# Patient Record
Sex: MALE | Race: WHITE | NOT HISPANIC OR LATINO
[De-identification: names, ages, dates, MRNs, and addresses within clinical notes are randomized per-mention and may not be internally consistent; named-entity substitution may affect disease eponyms.]

---

## 2021-05-24 ENCOUNTER — TRANSCRIPTION ENCOUNTER (OUTPATIENT)
Age: 56
End: 2021-05-24

## 2021-05-24 ENCOUNTER — INPATIENT (INPATIENT)
Facility: HOSPITAL | Age: 56
LOS: 4 days | Discharge: HOME | End: 2021-05-29
Attending: INTERNAL MEDICINE | Admitting: INTERNAL MEDICINE
Payer: COMMERCIAL

## 2021-05-24 VITALS
HEIGHT: 72 IN | HEART RATE: 88 BPM | WEIGHT: 187.39 LBS | SYSTOLIC BLOOD PRESSURE: 115 MMHG | OXYGEN SATURATION: 100 % | DIASTOLIC BLOOD PRESSURE: 76 MMHG | TEMPERATURE: 98 F | RESPIRATION RATE: 20 BRPM

## 2021-05-24 LAB
ALBUMIN SERPL ELPH-MCNC: 4.6 G/DL — SIGNIFICANT CHANGE UP (ref 3.5–5.2)
ALP SERPL-CCNC: 77 U/L — SIGNIFICANT CHANGE UP (ref 30–115)
ALT FLD-CCNC: 32 U/L — SIGNIFICANT CHANGE UP (ref 0–41)
ANION GAP SERPL CALC-SCNC: 9 MMOL/L — SIGNIFICANT CHANGE UP (ref 7–14)
APTT BLD: 30.4 SEC — SIGNIFICANT CHANGE UP (ref 27–39.2)
AST SERPL-CCNC: 24 U/L — SIGNIFICANT CHANGE UP (ref 0–41)
BASOPHILS # BLD AUTO: 0.06 K/UL — SIGNIFICANT CHANGE UP (ref 0–0.2)
BASOPHILS NFR BLD AUTO: 0.8 % — SIGNIFICANT CHANGE UP (ref 0–1)
BILIRUB SERPL-MCNC: 0.3 MG/DL — SIGNIFICANT CHANGE UP (ref 0.2–1.2)
BUN SERPL-MCNC: 11 MG/DL — SIGNIFICANT CHANGE UP (ref 10–20)
CALCIUM SERPL-MCNC: 9.4 MG/DL — SIGNIFICANT CHANGE UP (ref 8.5–10.1)
CHLORIDE SERPL-SCNC: 104 MMOL/L — SIGNIFICANT CHANGE UP (ref 98–110)
CO2 SERPL-SCNC: 25 MMOL/L — SIGNIFICANT CHANGE UP (ref 17–32)
CREAT SERPL-MCNC: 0.9 MG/DL — SIGNIFICANT CHANGE UP (ref 0.7–1.5)
EOSINOPHIL # BLD AUTO: 0.17 K/UL — SIGNIFICANT CHANGE UP (ref 0–0.7)
EOSINOPHIL NFR BLD AUTO: 2.2 % — SIGNIFICANT CHANGE UP (ref 0–8)
GLUCOSE SERPL-MCNC: 96 MG/DL — SIGNIFICANT CHANGE UP (ref 70–99)
HCT VFR BLD CALC: 42.8 % — SIGNIFICANT CHANGE UP (ref 42–52)
HGB BLD-MCNC: 14.2 G/DL — SIGNIFICANT CHANGE UP (ref 14–18)
IMM GRANULOCYTES NFR BLD AUTO: 0.1 % — SIGNIFICANT CHANGE UP (ref 0.1–0.3)
INR BLD: 1.1 RATIO — SIGNIFICANT CHANGE UP (ref 0.65–1.3)
LYMPHOCYTES # BLD AUTO: 2.35 K/UL — SIGNIFICANT CHANGE UP (ref 1.2–3.4)
LYMPHOCYTES # BLD AUTO: 31.1 % — SIGNIFICANT CHANGE UP (ref 20.5–51.1)
MCHC RBC-ENTMCNC: 27.7 PG — SIGNIFICANT CHANGE UP (ref 27–31)
MCHC RBC-ENTMCNC: 33.2 G/DL — SIGNIFICANT CHANGE UP (ref 32–37)
MCV RBC AUTO: 83.4 FL — SIGNIFICANT CHANGE UP (ref 80–94)
MONOCYTES # BLD AUTO: 0.58 K/UL — SIGNIFICANT CHANGE UP (ref 0.1–0.6)
MONOCYTES NFR BLD AUTO: 7.7 % — SIGNIFICANT CHANGE UP (ref 1.7–9.3)
NEUTROPHILS # BLD AUTO: 4.39 K/UL — SIGNIFICANT CHANGE UP (ref 1.4–6.5)
NEUTROPHILS NFR BLD AUTO: 58.1 % — SIGNIFICANT CHANGE UP (ref 42.2–75.2)
NRBC # BLD: 0 /100 WBCS — SIGNIFICANT CHANGE UP (ref 0–0)
PLATELET # BLD AUTO: 289 K/UL — SIGNIFICANT CHANGE UP (ref 130–400)
POTASSIUM SERPL-MCNC: 4.2 MMOL/L — SIGNIFICANT CHANGE UP (ref 3.5–5)
POTASSIUM SERPL-SCNC: 4.2 MMOL/L — SIGNIFICANT CHANGE UP (ref 3.5–5)
PROT SERPL-MCNC: 6.8 G/DL — SIGNIFICANT CHANGE UP (ref 6–8)
PROTHROM AB SERPL-ACNC: 12.7 SEC — SIGNIFICANT CHANGE UP (ref 9.95–12.87)
RBC # BLD: 5.13 M/UL — SIGNIFICANT CHANGE UP (ref 4.7–6.1)
RBC # FLD: 13 % — SIGNIFICANT CHANGE UP (ref 11.5–14.5)
SODIUM SERPL-SCNC: 138 MMOL/L — SIGNIFICANT CHANGE UP (ref 135–146)
TROPONIN T SERPL-MCNC: <0.01 NG/ML — SIGNIFICANT CHANGE UP
WBC # BLD: 7.56 K/UL — SIGNIFICANT CHANGE UP (ref 4.8–10.8)
WBC # FLD AUTO: 7.56 K/UL — SIGNIFICANT CHANGE UP (ref 4.8–10.8)

## 2021-05-24 PROCEDURE — 70498 CT ANGIOGRAPHY NECK: CPT | Mod: 26,MA

## 2021-05-24 PROCEDURE — 70450 CT HEAD/BRAIN W/O DYE: CPT | Mod: 26,59,MA

## 2021-05-24 PROCEDURE — 99223 1ST HOSP IP/OBS HIGH 75: CPT

## 2021-05-24 PROCEDURE — 99285 EMERGENCY DEPT VISIT HI MDM: CPT

## 2021-05-24 PROCEDURE — 93010 ELECTROCARDIOGRAM REPORT: CPT | Mod: 76

## 2021-05-24 PROCEDURE — 71045 X-RAY EXAM CHEST 1 VIEW: CPT | Mod: 26

## 2021-05-24 PROCEDURE — 70496 CT ANGIOGRAPHY HEAD: CPT | Mod: 26,MA

## 2021-05-24 RX ORDER — ASPIRIN/CALCIUM CARB/MAGNESIUM 324 MG
325 TABLET ORAL ONCE
Refills: 0 | Status: COMPLETED | OUTPATIENT
Start: 2021-05-24 | End: 2021-05-24

## 2021-05-24 RX ADMIN — Medication 325 MILLIGRAM(S): at 20:15

## 2021-05-24 NOTE — STROKE CODE NOTE - NSSTROKETEAMACTIVATEDDT_GEN_A_CORE
Concentration Of Solution Injected (Mg/Ml): 10.0
X Size Of Lesion In Cm (Optional): 0
Kenalog Preparation: Kenalog
Medical Necessity Clause: This procedure was medically necessary because the lesions that were treated were:
Detail Level: Detailed
Include Z78.9 (Other Specified Conditions Influencing Health Status) As An Associated Diagnosis?: No
Total Volume Injected (Ccs- Only Use Numbers And Decimals): 1
24-May-2021 19:49

## 2021-05-24 NOTE — ED PROVIDER NOTE - ATTENDING CONTRIBUTION TO CARE
57 yo M no pmh presents with numbness to the left arm. patient got a covid vaccine 4 days ago. This morning when he woke up at 6am noted numbness and tingling to the entire left arm. Last known well was yesterday at 11pm. no other numbness, tingling or weakness. Walking normally. no chest pain, no shortness of breath, no palpitations.     CONSTITUTIONAL: Well-developed; well-nourished; in no acute distress.   SKIN: warm, dry  HEAD: Normocephalic; atraumatic.  EYES: PERRL, EOMI, no conjunctival erythema  ENT: No nasal discharge; airway clear.  NECK: Supple; non tender.  CARD: S1, S2 normal;  Regular rate and rhythm.   RESP: No wheezes, rales or rhonchi.  ABD: soft non tender, non distended, no rebound or guarding.  EXT: Normal ROM.  5/5 strength in all 4 extremities   LYMPH: No acute cervical adenopathy.  NEURO: NIH of 1, decreased sensation to the left upper extremity.   PSYCH: Cooperative, appropriate.

## 2021-05-24 NOTE — ED PROVIDER NOTE - PROGRESS NOTE DETAILS
JESSI. [place] [facility] [place] at bedside upon arrival to ED. D.C. EKG noted, no previous in system. pt has not seen cardiologist, has had normal previous lab work. At this time no CP, SOB, palpitations. Cardiology consulted for evaluation. D.C. EKG noted, no previous in system. pt has not seen cardiologist, has had normal previous lab work annually. Last w/u normal by PCP. At this time no CP, SOB, palpitations. Cardiology consulted for evaluation. D.C: Repeat EKG normal and not indicative of ischemic changes.

## 2021-05-24 NOTE — ED ADULT NURSE NOTE - CHIEF COMPLAINT QUOTE
[place] [facility] [place] of sensation to left arm"; pt also presents with general malaise, lightheadedness, low grade fevers x3days. LUE symptoms started 6am this morning.  COVID vaccine received 5/20/21

## 2021-05-24 NOTE — ED PROVIDER NOTE - PHYSICAL EXAMINATION
Physical Exam    Vital Signs: I have reviewed the initial vital signs.  Constitutional: well-nourished, appears stated age, no acute distress  Eyes: Conjunctiva pink, Sclera clear, PERRLA, EOMI, no ptosis, no entrapment, no racoon eyes.  Cardiovascular: S1 and S2, regular rate, regular rhythm, well-perfused extremities, radial pulses equal and 2+, calves nonttp, equal in size  Respiratory: unlabored respiratory effort, speaking in full sentences, handling oral secretions, % on RA , on nasal cannula, clear to auscultation bilaterally no wheezing, rales and rhonchi  Gastrointestinal: soft, non-tender abdomen, no pulsatile mass, normal bowl sounds  Musculoskeletal: supple neck, no lower extremity edema, no midline tenderness, paraspinal tenderness, clavicular creptius, painful rom, moving all extreities appropriately, no gross bony deformities or swelling.  Integumentary: warm, dry, no rashes, lacerations,  Neurologic: awake, alert, cranial nerves II-XII grossly intact, extremities’ motor functions grossly intact, no nystagmus, tremors, fasciculations, facial droop, no ataxia, no dysmetria. Slight decreased to sensation to sharp stimulus along deltoid (LUE)

## 2021-05-24 NOTE — ED PROVIDER NOTE - CLINICAL SUMMARY MEDICAL DECISION MAKING FREE TEXT BOX
Patient presents with left arm numbness. stroke code called on arrival. labs, ekg, xray, ct, cta done. Found to have Abrupt occlusion of the right proximal M2 portion of the MCA with reconstitution distally. Not a TPA candidate at this time. Approved to stroke unit. Patient and family ware of findings. Patient's EKG shows ischemic changes, no chest pain or SOB. Repeat EKG normal. troponin negative. Patient admitted for further management.

## 2021-05-24 NOTE — ED PROVIDER NOTE - OBJECTIVE STATEMENT
56 y.o. male no pmh, s/p covid 19 vaccination on 5/20 with LUE sensation change since last night. Pt states started in upper arm and now in palm. Ex: unable to feel phone in pocket. No facial droop, slurred speech, difficuulty ambulating. + malaise, muscle weakness and subjective fevers. 56 y.o. male no pmh, s/p covid 19 vaccination on 5/20 with LUE sensation change since 6 am this morning. Last known well 11 pm last night. Pt states started in upper arm and now in palm. Ex: unable to feel phone in pocket.No facial droop, slurred speech, difficulty ambulating. + malaise, muscle weakness, + lightheadedness and subjective fever since yesterday..

## 2021-05-24 NOTE — CONSULT NOTE ADULT - SUBJECTIVE AND OBJECTIVE BOX
Vascular Neurology Consult Note:    KACI LYLES    1. Chief Complaint: Numbness    HPI: This is a 57 yo M who presented to the ED from urgent care complaining of left arm numbness. Symptoms started this morning at 0600, with a last known well 11 pm. He denies any focal weakness, aphasia, dysarthria, visual deficit, or other focal deficit.     2. Relevant PMH: Denied  Prior ischemic stroke/TIA[ ], Afib [ ], CAD [ ], HTN [ ], DLD [ ], DM [ ], PVD [ ], Obesity [ ],   Sedentary lifestyle [ ], CHF [ ], ORLANDO [ ], Cancer Hx [ ].    3. Social History: Smoking [ ], Drug Use [ ], Alcohol Use [ ], Other [ ]    4. Possible Location of Stroke: Right parietal lobe    5. Relevant Brain Tissue Imaging:  < from: CT Brain Stroke Protocol (05.24.21 @ 20:04) >  IMPRESSION:      1.  Subacute appearing infarct in the right parietotemporal region. No acute territorial infarct, intracranial hemorrhage, mass effect or midline shift. Focal thrombosis of the right MCA. Please correlate with concurrent CT  2.  Asymmetry of the right frontal horn, which may be normal variant.    < end of copied text >    6. Relevant Cerebrovascular Imaging:   CT Angio Head w/ IV Cont:   EXAM:  CT ANGIO NECK (W)AW IC        EXAM:  CT ANGIO BRAIN (W)AW IC            IMPRESSION:  Focal nonocclusive thrombus of the right inferior division M2 segment of the MCA with reconstitution distally. Distal MCA cortical branch vessels are well opacified.    Intracranial vessels are otherwise unremarkable without evidence of vessel malformation or aneurysm.    No carotid or vertebral artery stenosis.    7. Relevant blood tests:      8. Relevant cardiac rhythm monitoring: NSR, rate 65, normal QRS axis, ST depression lead III    9. Relevant Cardiac Structure: (TTE/ISRAEL +/-):[ ]No intracardiac thrombus/[ ] no vegetation/[ ]no akynesia/EF:    Home Medications:      MEDICATIONS  (STANDING):      10. PT/OT/Speech/Rehab/S&Sw/ Cognitive eval results and recommendations:    11. Neurologic Examination:  Mentation: Awake, alert, oriented to person, place, and time. Follows complex, multiple part commands. No neglect.  Language: Speech is clear and fluent.  Cranial Nerves:  	II – Visual fields full.  	III/IV/VI – Pupils 3 mm. PERRL. EOMI.  	V – Grossly intact sensation.  	VII – No facial palsy.  	VIII – No nystagmus.  	IX/X – Symmetric palate rise. Uvula midline.  	XI – SCM strong b/l.  	XII – Tongue protrusion midline.  Motor: 5/5 strength b/l. Normal bulk and tone.  Sensory: Decreased sensation to LT in LUE.  Reflexes: 2+ generally.  Cerebellum: No dysmetria. Gait deferred.    Vital Signs Last 24 Hrs  T(C): 36.4 (24 May 2021 19:33), Max: 36.4 (24 May 2021 19:33)  T(F): 97.5 (24 May 2021 19:33), Max: 97.5 (24 May 2021 19:33)  HR: 72 (24 May 2021 20:00) (72 - 88)  BP: 119/74 (24 May 2021 20:00) (115/76 - 119/74)  BP(mean): --  RR: 17 (24 May 2021 20:00) (17 - 20)  SpO2: 100% (24 May 2021 20:00) (100% - 100%)    12. NIH STROKE SCALE  Item	                                                        Score  1 a.	Level of Consciousness	               	  1 b. LOC Questions	                                  1 c.	LOC Commands	                               	  2.	Best Gaze	                                          3.	Visual	                                                  4.	Facial Palsy	                                          5 a.	Motor Arm - Left	                                  5 b.	Motor Arm - Right	                          6 a.	Motor Leg - Left	                                  6 b.	Motor Leg - Right	                                  7.	Limb Ataxia	                                          8.	Sensory	                                                1  9.	Language	                                          10.	Dysarthria	                                          11.	Extinction and Inattention  	          ______________________________________  TOTAL	                                                        1    Total NIHSS on admission:   1   NIHSS yesterday:      NIHSS today:     Baseline mRS:  0 No symptoms at all  1 No significant disability despite symptoms; able to carry out all usual duties and activities without assistance  2 Slight disability; unable to carry out all previous activities, but able to look after own affairs  3 Moderate disability; requiring some help, but able to walk without assistance  4 Moderately severe disability; unable to walk without assistance and unable to attend to own bodily needs without assistance  5 Severe disability; bedridden, incontinent and requiring constant nursing care and attention  6 Dead      13. Impression:    - Subacute right parietotemporal infarct  - Acute right M2 occlusion     14. Probable cause/s of Stroke: Unclear, cannot exclude cardioembolism       15. Suggestions:   Routine stroke workup including:    - Neuro check q4  -  mg now then 81 mg daily  - Lipitor 80 mg HS  - Brain MRI if no contraindication  - Check 2D echocardiogram w/bubble  - Check lipid panel/A1c  - Check hypercoagulable panel  - May require ISRAEL/ILR this admission  - Telemetry  - Nursing swallow screen  - PT/OT eval and tx    This was not a candidate for emergent neuroendovascular intervention given low NIHSS score consistent with non-disabling deficits. He was out of the tPA window.    16. Disposition: Stroke Unit   Vascular Neurology Consult Note:    KACI LYLES    1. Chief Complaint: Numbness    HPI: This is a 57 yo M who presented to the ED from urgent care complaining of left arm numbness. Symptoms started this morning at 0600, with a last known well 11 pm. He denies any focal weakness, aphasia, dysarthria, visual deficit, or other focal deficit.     2. Relevant PMH: Denied  Prior ischemic stroke/TIA[ ], Afib [ ], CAD [ ], HTN [ ], DLD [ ], DM [ ], PVD [ ], Obesity [ ],   Sedentary lifestyle [ ], CHF [ ], [place]O [ ], Cancer Hx [ ].    3. Social History: Smoking [ ], Drug Use [ ], Alcohol Use [ ], Other [ ]    4. Possible Location of Stroke: Right parietal lobe    5. Relevant Brain Tissue Imaging:  < from: CT Brain Stroke Protocol (05.24.21 @ 20:04) >  IMPRESSION:      1.  Subacute appearing infarct in the right parietotemporal region. No acute territorial infarct, intracranial hemorrhage, mass effect or midline shift. Focal thrombosis of the right MCA. Please correlate with concurrent CT  2.  Asymmetry of the right frontal horn, which may be normal variant.    < end of copied text >    6. Relevant Cerebrovascular Imaging:   CT Angio Head w/ IV Cont:   EXAM:  CT ANGIO NECK (W)AW IC        EXAM:  CT ANGIO BRAIN (W)AW IC            IMPRESSION:  Focal nonocclusive thrombus of the right inferior division M2 segment of the MCA with reconstitution distally. Distal MCA cortical branch vessels are well opacified.    Intracranial vessels are otherwise unremarkable without evidence of vessel malformation or aneurysm.    No carotid or vertebral artery stenosis.    7. Relevant blood tests:      8. Relevant cardiac rhythm monitoring: NSR, rate 65, normal QRS axis, ST depression lead III    9. Relevant Cardiac Structure: (TTE/ISRAEL +/-):[ ]No intracardiac thrombus/[ ] no vegetation/[ ]no akynesia/EF:    Home Medications:      MEDICATIONS  (STANDING):      10. PT/OT/Speech/Rehab/S&Sw/ Cognitive eval results and recommendations:    11. Neurologic Examination:  Mentation: Awake, alert, oriented to person, place, and time. Follows complex, multiple part commands. No neglect.  Language: Speech is clear and fluent.  Cranial Nerves:  	II – Visual fields full.  	III/IV/VI – Pupils 3 mm. PERRL. EOMI.  	V – Grossly intact sensation.  	VII – No facial palsy.  	VIII – No nystagmus.  	IX/X – Symmetric palate rise. Uvula midline.  	XI – SCM strong b/l.  	XII – Tongue protrusion midline.  Motor: 5/5 strength b/l. Normal bulk and tone.  Sensory: Decreased sensation to LT in LUE.  Reflexes: 2+ generally.  Cerebellum: No dysmetria. Gait deferred.    Vital Signs Last 24 Hrs  T(C): 36.4 (24 May 2021 19:33), Max: 36.4 (24 May 2021 19:33)  T(F): 97.5 (24 May 2021 19:33), Max: 97.5 (24 May 2021 19:33)  HR: 72 (24 May 2021 20:00) (72 - 88)  BP: 119/74 (24 May 2021 20:00) (115/76 - 119/74)  BP(mean): --  RR: 17 (24 May 2021 20:00) (17 - 20)  SpO2: 100% (24 May 2021 20:00) (100% - 100%)    12. NIH STROKE SCALE  Item	                                                        Score  1 a.	Level of Consciousness	               	  1 b. LOC Questions	                                  1 c.	LOC Commands	                               	  2.	Best Gaze	                                          3.	Visual	                                                  4.	Facial Palsy	                                          5 a.	Motor Arm - Left	                                  5 b.	Motor Arm - Right	                          6 a.	Motor Leg - Left	                                  6 b.	Motor Leg - Right	                                  7.	Limb Ataxia	                                          8.	Sensory	                                                1  9.	Language	                                          10.	Dysarthria	                                          11.	Extinction and Inattention  	          ______________________________________  TOTAL	                                                        1    Total NIHSS on admission:   1   NIHSS yesterday:      NIHSS today: 1    Baseline mRS:  0 No symptoms at all  1 No significant disability despite symptoms; able to carry out all usual duties and activities without assistance  2 Slight disability; unable to carry out all previous activities, but able to look after own affairs  3 Moderate disability; requiring some help, but able to walk without assistance  4 Moderately severe disability; unable to walk without assistance and unable to attend to own bodily needs without assistance  5 Severe disability; bedridden, incontinent and requiring constant nursing care and attention  6 Dead      13. Impression:    - Subacute right parietotemporal infarct  - Acute right M2 occlusion     14. Probable cause/s of Stroke: Unclear, cannot exclude cardioembolism       15. Suggestions:   Routine stroke workup including:    - Neuro check q4  -  mg now then 81 mg daily  -Plavix 300 load and continue 75 daily  - Lipitor 80 mg HS  - Brain MRI No ANA  - Check 2D echocardiogram w/bubble  - Check lipid panel/A1c  - Check hypercoagulable panel  -  ISRAEL/ILR   - PT/OT eval and tx    This was not a candidate for emergent neuroendovascular intervention given low NIHSS score consistent with non-disabling deficits. He was out of the tPA window.    16. Disposition: Stroke Unit

## 2021-05-24 NOTE — ED ADULT TRIAGE NOTE - CHIEF COMPLAINT QUOTE
[place] [facility] [place]"; pt also presents with general malaise, lightheadedness, low grade fevers x3days. LUE symptoms started 6am this morning.  COVID vaccine received 5/20/21

## 2021-05-24 NOTE — ED ADULT NURSE NOTE - OBJECTIVE STATEMENT
[facility] for "loss of sensation to left arm"; pt also presents with general malaise, lightheadedness, low grade fevers x3days. LUE symptoms started 6am this morning.

## 2021-05-24 NOTE — ED PROVIDER NOTE - NS ED ROS FT
Constitutional: (+) fever (-) chills (+) lightheadedness   Eyes/ENT: (-) blurry vision, (-) epistaxis (-) rhinorrhea (-) nasal congestion  Cardiovascular: (-) chest pain, (-) syncope (-) palpitations   Respiratory: (-) cough, (-) shortness of breath (-) pleurisy   Gastrointestinal: (-) vomiting, (-) diarrhea (-) abdominal pain (-) nausea (-) anorexia  Musculoskeletal: (-) neck pain, (-) back pain, (-) joint pain (-) joint swelling (-) painful ROM  Integumentary: (-) rash, (-) edema (-) lacerations (-) pruritis   Neurological: (+) headache, (-) altered mental status (-) LOC (-) dizziness (+) paresthesias (-) gait abnormalities

## 2021-05-24 NOTE — ED ADULT NURSE NOTE - PMH
Keep splints on both legs  Continue present medications  For severe pain take either tramadol or Percocet but do not take both the same day  Contact Dr Dae Mejia office  Ask them how he wants you to care for the wound on your foot, since the splint is over it  Ask if you need home health nursing for that  Obtain wheelchair, commode and sliding board from [facility]    Follow the directions given to you by our care management team  No pertinent past medical history     <<----- Click to add NO pertinent Past Medical History

## 2021-05-25 LAB
A1C WITH ESTIMATED AVERAGE GLUCOSE RESULT: 5.4 % — SIGNIFICANT CHANGE UP (ref 4–5.6)
ALBUMIN SERPL ELPH-MCNC: 4.2 G/DL — SIGNIFICANT CHANGE UP (ref 3.5–5.2)
ALP SERPL-CCNC: 76 U/L — SIGNIFICANT CHANGE UP (ref 30–115)
ALT FLD-CCNC: 28 U/L — SIGNIFICANT CHANGE UP (ref 0–41)
ANION GAP SERPL CALC-SCNC: 8 MMOL/L — SIGNIFICANT CHANGE UP (ref 7–14)
APTT BLD: 29.8 SEC — SIGNIFICANT CHANGE UP (ref 27–39.2)
AST SERPL-CCNC: 22 U/L — SIGNIFICANT CHANGE UP (ref 0–41)
BASOPHILS # BLD AUTO: 0.08 K/UL — SIGNIFICANT CHANGE UP (ref 0–0.2)
BASOPHILS NFR BLD AUTO: 1.4 % — HIGH (ref 0–1)
BILIRUB SERPL-MCNC: 0.4 MG/DL — SIGNIFICANT CHANGE UP (ref 0.2–1.2)
BUN SERPL-MCNC: 9 MG/DL — LOW (ref 10–20)
CALCIUM SERPL-MCNC: 9.2 MG/DL — SIGNIFICANT CHANGE UP (ref 8.5–10.1)
CHLORIDE SERPL-SCNC: 105 MMOL/L — SIGNIFICANT CHANGE UP (ref 98–110)
CHOLEST SERPL-MCNC: 190 MG/DL — SIGNIFICANT CHANGE UP
CO2 SERPL-SCNC: 26 MMOL/L — SIGNIFICANT CHANGE UP (ref 17–32)
COVID-19 SPIKE DOMAIN AB INTERP: POSITIVE
COVID-19 SPIKE DOMAIN ANTIBODY RESULT: >250 U/ML — HIGH
CREAT SERPL-MCNC: 0.8 MG/DL — SIGNIFICANT CHANGE UP (ref 0.7–1.5)
EOSINOPHIL # BLD AUTO: 0.23 K/UL — SIGNIFICANT CHANGE UP (ref 0–0.7)
EOSINOPHIL NFR BLD AUTO: 4 % — SIGNIFICANT CHANGE UP (ref 0–8)
ESTIMATED AVERAGE GLUCOSE: 108 MG/DL — SIGNIFICANT CHANGE UP (ref 68–114)
GLUCOSE SERPL-MCNC: 97 MG/DL — SIGNIFICANT CHANGE UP (ref 70–99)
HCT VFR BLD CALC: 41.3 % — LOW (ref 42–52)
HCV AB S/CO SERPL IA: 0.03 COI — SIGNIFICANT CHANGE UP
HCV AB SERPL-IMP: SIGNIFICANT CHANGE UP
HDLC SERPL-MCNC: 38 MG/DL — LOW
HGB BLD-MCNC: 14 G/DL — SIGNIFICANT CHANGE UP (ref 14–18)
IMM GRANULOCYTES NFR BLD AUTO: 0.2 % — SIGNIFICANT CHANGE UP (ref 0.1–0.3)
INR BLD: 1.12 RATIO — SIGNIFICANT CHANGE UP (ref 0.65–1.3)
LIPID PNL WITH DIRECT LDL SERPL: 125 MG/DL — HIGH
LYMPHOCYTES # BLD AUTO: 2.14 K/UL — SIGNIFICANT CHANGE UP (ref 1.2–3.4)
LYMPHOCYTES # BLD AUTO: 36.8 % — SIGNIFICANT CHANGE UP (ref 20.5–51.1)
MAGNESIUM SERPL-MCNC: 2.2 MG/DL — SIGNIFICANT CHANGE UP (ref 1.8–2.4)
MCHC RBC-ENTMCNC: 28.2 PG — SIGNIFICANT CHANGE UP (ref 27–31)
MCHC RBC-ENTMCNC: 33.9 G/DL — SIGNIFICANT CHANGE UP (ref 32–37)
MCV RBC AUTO: 83.1 FL — SIGNIFICANT CHANGE UP (ref 80–94)
MONOCYTES # BLD AUTO: 0.47 K/UL — SIGNIFICANT CHANGE UP (ref 0.1–0.6)
MONOCYTES NFR BLD AUTO: 8.1 % — SIGNIFICANT CHANGE UP (ref 1.7–9.3)
NEUTROPHILS # BLD AUTO: 2.88 K/UL — SIGNIFICANT CHANGE UP (ref 1.4–6.5)
NEUTROPHILS NFR BLD AUTO: 49.5 % — SIGNIFICANT CHANGE UP (ref 42.2–75.2)
NON HDL CHOLESTEROL: 152 MG/DL — HIGH
NRBC # BLD: 0 /100 WBCS — SIGNIFICANT CHANGE UP (ref 0–0)
PLATELET # BLD AUTO: 256 K/UL — SIGNIFICANT CHANGE UP (ref 130–400)
POTASSIUM SERPL-MCNC: 4.3 MMOL/L — SIGNIFICANT CHANGE UP (ref 3.5–5)
POTASSIUM SERPL-SCNC: 4.3 MMOL/L — SIGNIFICANT CHANGE UP (ref 3.5–5)
PROT SERPL-MCNC: 6.2 G/DL — SIGNIFICANT CHANGE UP (ref 6–8)
PROTHROM AB SERPL-ACNC: 12.9 SEC — HIGH (ref 9.95–12.87)
RBC # BLD: 4.97 M/UL — SIGNIFICANT CHANGE UP (ref 4.7–6.1)
RBC # FLD: 13.2 % — SIGNIFICANT CHANGE UP (ref 11.5–14.5)
SARS-COV-2 IGG+IGM SERPL QL IA: >250 U/ML — HIGH
SARS-COV-2 IGG+IGM SERPL QL IA: POSITIVE
SARS-COV-2 RNA SPEC QL NAA+PROBE: SIGNIFICANT CHANGE UP
SODIUM SERPL-SCNC: 139 MMOL/L — SIGNIFICANT CHANGE UP (ref 135–146)
TRIGL SERPL-MCNC: 156 MG/DL — HIGH
TSH SERPL-MCNC: 0.65 UIU/ML — SIGNIFICANT CHANGE UP (ref 0.27–4.2)
WBC # BLD: 5.81 K/UL — SIGNIFICANT CHANGE UP (ref 4.8–10.8)
WBC # FLD AUTO: 5.81 K/UL — SIGNIFICANT CHANGE UP (ref 4.8–10.8)

## 2021-05-25 PROCEDURE — 99223 1ST HOSP IP/OBS HIGH 75: CPT

## 2021-05-25 PROCEDURE — 70553 MRI BRAIN STEM W/O & W/DYE: CPT | Mod: 26

## 2021-05-25 RX ORDER — ASPIRIN/CALCIUM CARB/MAGNESIUM 324 MG
81 TABLET ORAL DAILY
Refills: 0 | Status: DISCONTINUED | OUTPATIENT
Start: 2021-05-25 | End: 2021-05-29

## 2021-05-25 RX ORDER — CLOPIDOGREL BISULFATE 75 MG/1
300 TABLET, FILM COATED ORAL ONCE
Refills: 0 | Status: COMPLETED | OUTPATIENT
Start: 2021-05-25 | End: 2021-05-25

## 2021-05-25 RX ORDER — CLOPIDOGREL BISULFATE 75 MG/1
75 TABLET, FILM COATED ORAL DAILY
Refills: 0 | Status: DISCONTINUED | OUTPATIENT
Start: 2021-05-25 | End: 2021-05-29

## 2021-05-25 RX ORDER — ATORVASTATIN CALCIUM 80 MG/1
80 TABLET, FILM COATED ORAL AT BEDTIME
Refills: 0 | Status: DISCONTINUED | OUTPATIENT
Start: 2021-05-25 | End: 2021-05-29

## 2021-05-25 RX ORDER — CHLORHEXIDINE GLUCONATE 213 G/1000ML
1 SOLUTION TOPICAL ONCE
Refills: 0 | Status: DISCONTINUED | OUTPATIENT
Start: 2021-05-25 | End: 2021-05-29

## 2021-05-25 RX ORDER — ENOXAPARIN SODIUM 100 MG/ML
40 INJECTION SUBCUTANEOUS DAILY
Refills: 0 | Status: DISCONTINUED | OUTPATIENT
Start: 2021-05-25 | End: 2021-05-29

## 2021-05-25 RX ORDER — SODIUM CHLORIDE 9 MG/ML
1000 INJECTION INTRAMUSCULAR; INTRAVENOUS; SUBCUTANEOUS
Refills: 0 | Status: DISCONTINUED | OUTPATIENT
Start: 2021-05-25 | End: 2021-05-28

## 2021-05-25 RX ADMIN — Medication 81 MILLIGRAM(S): at 11:25

## 2021-05-25 RX ADMIN — ENOXAPARIN SODIUM 40 MILLIGRAM(S): 100 INJECTION SUBCUTANEOUS at 11:25

## 2021-05-25 RX ADMIN — SODIUM CHLORIDE 125 MILLILITER(S): 9 INJECTION INTRAMUSCULAR; INTRAVENOUS; SUBCUTANEOUS at 12:22

## 2021-05-25 RX ADMIN — CLOPIDOGREL BISULFATE 75 MILLIGRAM(S): 75 TABLET, FILM COATED ORAL at 12:16

## 2021-05-25 RX ADMIN — ATORVASTATIN CALCIUM 80 MILLIGRAM(S): 80 TABLET, FILM COATED ORAL at 21:57

## 2021-05-25 RX ADMIN — CLOPIDOGREL BISULFATE 300 MILLIGRAM(S): 75 TABLET, FILM COATED ORAL at 12:16

## 2021-05-25 RX ADMIN — SODIUM CHLORIDE 125 MILLILITER(S): 9 INJECTION INTRAMUSCULAR; INTRAVENOUS; SUBCUTANEOUS at 21:58

## 2021-05-25 NOTE — H&P ADULT - HISTORY OF PRESENT ILLNESS
54 yo M w/ no pmhx presents from Urgent Care w/ LUE numbness that started this AM ~6AM.     Denies dysarthria, aphasia, visual or other sensory defects.    ED course: *** Code stroke called ***   - VS wnl  - CXR not impressive  - MR Brain: Subacute appearing infarct in the right parietotemporal region.    - CTA H&N: Focal nonocclusive thrombus of the right inferior division M2 segment of the MCA with reconstitution distally.  - Seen by Neurology>> Out of window for tPA & not a candidate for emergent neuroendovascular intervention given low NIHSS score consistent with non-disabling deficits. He was out of the tPA window.  - Received 325mg Aspirin x1    Admit to Stroke Unit.    54 yo M w/ no pmhx presents from Urgent Care w/ LUE numbness that started this AM ~6AM.     Denies dysarthria, aphasia, visual or other sensory defects.    ED course: *** Code stroke called ***   - VS wnl  - CXR not impressive  - MR Brain: Subacute appearing infarct in the right parietotemporal region.    - CTA H&N: Focal nonocclusive thrombus of the right inferior division M2 segment of the MCA with reconstitution distally.  - Seen by Neurology>> Out of window for tPA & not a candidate for emergent neuroendovascular intervention given low NIHSS score consistent with non-disabling deficits.   - Received 325mg Aspirin x1    Admit to Stroke Unit.    54 yo M w/ no pmhx presents from Urgent Care w/ LUE numbness that started this AM ~6AM.   Patient received 2nd dose of Moderna COVID Vaccine ~10days ago.     Denies dysarthria, aphasia, visual or other sensory defects.    ED course: *** Code stroke called ***   - VS wnl  - CXR not impressive  - MR Brain: Subacute appearing infarct in the right parietotemporal region.    - CTA H&N: Focal nonocclusive thrombus of the right inferior division M2 segment of the MCA with reconstitution distally.  - Seen by Neurology>> Out of window for tPA & not a candidate for emergent neuroendovascular intervention given low NIHSS score consistent with non-disabling deficits.   - Received 325mg Aspirin x1    Admit to Stroke Unit.

## 2021-05-25 NOTE — H&P ADULT - ATTENDING COMMENTS
pt seen and examined.   still has difficulty with fine motor function on the left arm   T(F): 97.8 (05-25-21 @ 08:05), Max: 98.4 (05-25-21 @ 05:00)  HR: 77 (05-25-21 @ 08:05) (63 - 88)  BP: 104/73 (05-25-21 @ 08:05) (104/73 - 120/66)  RR: 17 (05-25-21 @ 08:05) (17 - 20)  SpO2: 97% (05-25-21 @ 08:05) (97% - 100%)    physical exam Physical exam:   constitutional NAD, AAOX3, Respiratory lungs CTA, CVS heart RRR, GI: abdomen Soft NT, ND, BS+, skin: intact  neuro exam left upper ext numbness and slight weakness.     < from: CT Angio Neck w/ IV Cont (05.24.21 @ 20:23) >  Focal nonocclusive thrombus of the right inferior division M2 segment of the MCA with reconstitution distally. Distal MCA cortical branch vessels are well opacified.  Intracranial vessels are otherwise unremarkable without evidence of vessel malformation or aneurysm.  No carotid or vertebral artery stenosis.  < end of copied text >    Lipid Profile (05.25.21 @ 05:20)    Cholesterol, Serum: 190 mg/dL    Triglycerides, Serum: 156 mg/dL    HDL Cholesterol, Serum: 38 mg/dL    Non HDL Cholesterol: 152    a/p  # Acute cva, MCA thrombosis , MRI brain, consider ac, will need workup for hypercoagulable state, as well as echo / may need ISRAEL, as per neurology    #Progress Note Handoff  Pending (specify):  MRI brain , echo   Family discussion: dw pt, fully aaox3,   Disposition: Home

## 2021-05-25 NOTE — OCCUPATIONAL THERAPY INITIAL EVALUATION ADULT - PLANNED THERAPY INTERVENTIONS, OT EVAL
ADL retraining/cognitive, visual perceptual/fine motor coordination training/motor coordination training/neuromuscular re-education/ROM

## 2021-05-25 NOTE — OCCUPATIONAL THERAPY INITIAL EVALUATION ADULT - GENERAL OBSERVATIONS, REHAB EVAL
Pt received semi [place] on stretcher, spouse and sister present, +tele, +pulse oxi, agreeable to OT evaluation

## 2021-05-25 NOTE — H&P ADULT - ASSESSMENT
56 yo M w/ no pmhx presents from Urgent Care w/ LUE numbness that started this AM ~6AM.   Denies focal weakness, dysarthria, aphasia, visual or other sensory defects    ED: Code stroke called>> MR Brain (+) for Rt. Parietotemporal Subacute infarct>> Out of tPA window & not an emergent neuroendovascular intervention candidate.    Admit to Stroke Unit;      # Subacute Infarct in the Rt. Parietotemporal Region - p/w LUE Numbness; NIHSS=1   No risk factors for stroke noted except for a Vaccine shot ~10days ago  - Seen by Neurology>> Out of tPA window & not an emergent neuroendovascular intervention candidate given low NIHSS & nondisabling deficit; May need ISRAEL if TTE is negative;  - Loaded w/ 325mg Aspirin this started on Aspirin 81mg Daily & Lipitor 80mg Daily;  - Neuro check q4;  - Bedside Dysphagia screen>>  > fu MR Brain w/ ANA & TTE  > fu Lipid profile, A1c, Coags  > fu PT/OT         56 yo M w/ no pmhx presents from Urgent Care w/ LUE numbness that started this AM ~6AM.   Denies focal weakness, dysarthria, aphasia, visual or other sensory defects    ED: Code stroke called>> MR Brain (+) for Rt. Parietotemporal Subacute infarct>> Out of tPA window & not an emergent neuroendovascular intervention candidate.    Admit to Stroke Unit;      # Subacute Infarct in the Rt. Parietotemporal Region - p/w LUE Numbness; NIHSS=1   No risk factors for stroke noted except for 2nd Moderna Vaccine Vaccine shot ~10days ago;  On my exam: Fine touch sensation impaired from hand to of mid-forearm; Pt. unable to distinguish objects inLeft hand w/ eyes closed (Astereognosis);   - Seen by Neurology>> Out of tPA window & not an emergent neuroendovascular intervention candidate given low NIHSS & nondisabling deficit; May need ISRAEL if TTE is negative;  - Bedside Dysphagia screen>> Negative for aspiration; Able to tolerate eating and drinking; No need for S&S assessment;  - Loaded w/ 325mg Aspirin this started on Aspirin 81mg Daily & Lipitor 80mg Daily;  - Neuro check q4;  > fu MR Brain w/ ANA & TTE  > fu Lipid profile, A1c, Coags  > fu PT/OT    Diet: DASH  Activity: AAT  DVT ppx: Lovenox 40mg subq Daily  GI ppx: n/a    Dispo: Acute (from home)           54 yo M w/ no pmhx presents from Urgent Care w/ LUE numbness that started this AM ~6AM.   Denies focal weakness, dysarthria, aphasia, visual or other sensory defects    ED: Code stroke called>> MR Brain (+) for Rt. Parietotemporal Subacute infarct>> Out of tPA window & not an emergent neuroendovascular intervention candidate.    Admit to Stroke Unit;      # Subacute Infarct in the Rt. Parietotemporal Region - p/w LUE Numbness; NIHSS=1   No risk factors for stroke noted except for 2nd Moderna COVID Vaccine shot ~10days ago;  On my exam: Fine touch sensation impaired from hand to mid-forearm;   Pt. unable to distinguish objects in Left hand w/ eyes closed (Astereognosis);   - Seen by Neurology (Code Stroke)>> Out of tPA window & not an emergent neuroendovascular intervention candidate given low NIHSS & nondisabling deficit; May need ISRAEL if TTE is negative;  - Bedside Dysphagia screen>> Negative for aspiration; Able to tolerate eating and drinking; No need for S&S assessment;  - Loaded w/ 325mg Aspirin this started on Aspirin 81mg Daily & Lipitor 80mg Daily;  - Neuro check q4;  > fu MR Brain w/ ANA & TTE  > fu Lipid profile, A1c, Coags  > fu PT/OT    Diet: DASH  Activity: AAT  DVT ppx: Lovenox 40mg subq Daily  GI ppx: n/a    Dispo: Acute (from home)

## 2021-05-25 NOTE — H&P ADULT - NSHPPHYSICALEXAM_GEN_ALL_CORE
PHYSICAL EXAM:  GENERAL: NAD, lying in bed comfortably  HEAD:  Atraumatic, Normocephalic  EYES: EOMI, PERRLA, conjunctiva and sclera clear  ENT: Moist mucous membranes  NECK: Supple, No JVD  CHEST/LUNG: Clear to auscultation bilaterally; No rales, rhonchi, wheezing, or rubs. Unlabored respirations  HEART: Regular rate and rhythm; No murmurs, rubs, or gallops  ABDOMEN: Bowel sounds present; Soft, Nontender, Nondistended. No hepatomegaly  EXTREMITIES:  2+ Peripheral Pulses, brisk capillary refill. No clubbing, cyanosis, or edema  NERVOUS SYSTEM:  Alert & Oriented X3, speech clear. No deficits   MSK: FROM all 4 extremities, full and equal strength  SKIN: No rashes or lesions PHYSICAL EXAM:  GENERAL: NAD, lying in bed comfortably  HEAD:  Atraumatic, Normocephalic  EYES: Conjunctiva and sclera clear  CHEST/LUNG: Clear to auscultation bilaterally;   HEART: Regular rate and rhythm; No murmurs  ABDOMEN: Soft, Nontender, Nondistended.  EXTREMITIES:  2+ Peripheral Pulses, No edema  NERVOUS SYSTEM:  Alert & Oriented X3, speech clear. No deficits   - CN intact  - Motor: 5/5 in all 4 limbs  - Sensory: Impaired only in left hand  - Normal Gait

## 2021-05-25 NOTE — PHYSICAL THERAPY INITIAL EVALUATION ADULT - REHAB POTENTIAL, PT EVAL
No need for skilled PT. Pt independent with mobility. Recommend continued OT for L forearm/hand numbness.

## 2021-05-25 NOTE — H&P ADULT - NSHPLABSRESULTS_GEN_ALL_CORE
14.2   7.56  )-----------( 289      ( 24 May 2021 20:10 )             42.8       05-24    138  |  104  |  11  ----------------------------<  96  4.2   |  25  |  0.9    Ca    9.4      24 May 2021 20:10    TPro  6.8  /  Alb  4.6  /  TBili  0.3  /  DBili  x   /  AST  24  /  ALT  32  /  AlkPhos  77  05-24                  PT/INR - ( 24 May 2021 20:10 )   PT: 12.70 sec;   INR: 1.10 ratio         PTT - ( 24 May 2021 20:10 )  PTT:30.4 sec    CARDIAC MARKERS ( 24 May 2021 20:10 )  x     / <0.01 ng/mL / x     / x     / x            CAPILLARY BLOOD GLUCOSE  91 (24 May 2021 23:02)      POCT Blood Glucose.: 91 mg/dL (24 May 2021 19:50) 14.2   7.56  )-----------( 289      ( 24 May 2021 20:10 )             42.8       05-24    138  |  104  |  11  ----------------------------<  96  4.2   |  25  |  0.9    Ca    9.4      24 May 2021 20:10    TPro  6.8  /  Alb  4.6  /  TBili  0.3  /  DBili  x   /  AST  24  /  ALT  32  /  AlkPhos  77  05-24    PT/INR - ( 24 May 2021 20:10 )   PT: 12.70 sec;   INR: 1.10 ratio       PTT - ( 24 May 2021 20:10 )  PTT:30.4 sec    CARDIAC MARKERS ( 24 May 2021 20:10 )  x     / <0.01 ng/mL / x     / x     / x        CAPILLARY BLOOD GLUCOSE  91 (24 May 2021 23:02)    POCT Blood Glucose.: 91 mg/dL (24 May 2021 19:50)

## 2021-05-25 NOTE — OCCUPATIONAL THERAPY INITIAL EVALUATION ADULT - ADL RETRAINING, OT EVAL
Patient will perform upper body dressing independently by discharge. ; Patient will perform lower body dressing independently with use of appropriate adaptive equipment as needed by discharge.

## 2021-05-26 LAB
ALBUMIN SERPL ELPH-MCNC: 3.9 G/DL — SIGNIFICANT CHANGE UP (ref 3.5–5.2)
ALP SERPL-CCNC: 76 U/L — SIGNIFICANT CHANGE UP (ref 30–115)
ALT FLD-CCNC: 25 U/L — SIGNIFICANT CHANGE UP (ref 0–41)
ANION GAP SERPL CALC-SCNC: 8 MMOL/L — SIGNIFICANT CHANGE UP (ref 7–14)
AST SERPL-CCNC: 18 U/L — SIGNIFICANT CHANGE UP (ref 0–41)
BASOPHILS # BLD AUTO: 0.06 K/UL — SIGNIFICANT CHANGE UP (ref 0–0.2)
BASOPHILS NFR BLD AUTO: 1.1 % — HIGH (ref 0–1)
BILIRUB SERPL-MCNC: 0.4 MG/DL — SIGNIFICANT CHANGE UP (ref 0.2–1.2)
BUN SERPL-MCNC: 7 MG/DL — LOW (ref 10–20)
CALCIUM SERPL-MCNC: 8.6 MG/DL — SIGNIFICANT CHANGE UP (ref 8.5–10.1)
CHLORIDE SERPL-SCNC: 107 MMOL/L — SIGNIFICANT CHANGE UP (ref 98–110)
CO2 SERPL-SCNC: 26 MMOL/L — SIGNIFICANT CHANGE UP (ref 17–32)
CREAT SERPL-MCNC: 0.7 MG/DL — SIGNIFICANT CHANGE UP (ref 0.7–1.5)
EOSINOPHIL # BLD AUTO: 0.13 K/UL — SIGNIFICANT CHANGE UP (ref 0–0.7)
EOSINOPHIL NFR BLD AUTO: 2.4 % — SIGNIFICANT CHANGE UP (ref 0–8)
GLUCOSE SERPL-MCNC: 97 MG/DL — SIGNIFICANT CHANGE UP (ref 70–99)
HCT VFR BLD CALC: 42.2 % — SIGNIFICANT CHANGE UP (ref 42–52)
HCYS SERPL-MCNC: 15.7 UMOL/L — HIGH
HGB BLD-MCNC: 14.1 G/DL — SIGNIFICANT CHANGE UP (ref 14–18)
IMM GRANULOCYTES NFR BLD AUTO: 0.2 % — SIGNIFICANT CHANGE UP (ref 0.1–0.3)
LYMPHOCYTES # BLD AUTO: 1.95 K/UL — SIGNIFICANT CHANGE UP (ref 1.2–3.4)
LYMPHOCYTES # BLD AUTO: 35.6 % — SIGNIFICANT CHANGE UP (ref 20.5–51.1)
MCHC RBC-ENTMCNC: 27.8 PG — SIGNIFICANT CHANGE UP (ref 27–31)
MCHC RBC-ENTMCNC: 33.4 G/DL — SIGNIFICANT CHANGE UP (ref 32–37)
MCV RBC AUTO: 83.2 FL — SIGNIFICANT CHANGE UP (ref 80–94)
MONOCYTES # BLD AUTO: 0.42 K/UL — SIGNIFICANT CHANGE UP (ref 0.1–0.6)
MONOCYTES NFR BLD AUTO: 7.7 % — SIGNIFICANT CHANGE UP (ref 1.7–9.3)
NEUTROPHILS # BLD AUTO: 2.9 K/UL — SIGNIFICANT CHANGE UP (ref 1.4–6.5)
NEUTROPHILS NFR BLD AUTO: 53 % — SIGNIFICANT CHANGE UP (ref 42.2–75.2)
NRBC # BLD: 0 /100 WBCS — SIGNIFICANT CHANGE UP (ref 0–0)
PLATELET # BLD AUTO: 251 K/UL — SIGNIFICANT CHANGE UP (ref 130–400)
POTASSIUM SERPL-MCNC: 4.1 MMOL/L — SIGNIFICANT CHANGE UP (ref 3.5–5)
POTASSIUM SERPL-SCNC: 4.1 MMOL/L — SIGNIFICANT CHANGE UP (ref 3.5–5)
PROT SERPL-MCNC: 6 G/DL — SIGNIFICANT CHANGE UP (ref 6–8)
RBC # BLD: 5.07 M/UL — SIGNIFICANT CHANGE UP (ref 4.7–6.1)
RBC # FLD: 13.2 % — SIGNIFICANT CHANGE UP (ref 11.5–14.5)
SODIUM SERPL-SCNC: 141 MMOL/L — SIGNIFICANT CHANGE UP (ref 135–146)
WBC # BLD: 5.47 K/UL — SIGNIFICANT CHANGE UP (ref 4.8–10.8)
WBC # FLD AUTO: 5.47 K/UL — SIGNIFICANT CHANGE UP (ref 4.8–10.8)

## 2021-05-26 PROCEDURE — 99233 SBSQ HOSP IP/OBS HIGH 50: CPT

## 2021-05-26 PROCEDURE — 93306 TTE W/DOPPLER COMPLETE: CPT | Mod: 26

## 2021-05-26 RX ORDER — ACETAMINOPHEN 500 MG
650 TABLET ORAL EVERY 6 HOURS
Refills: 0 | Status: DISCONTINUED | OUTPATIENT
Start: 2021-05-26 | End: 2021-05-29

## 2021-05-26 RX ADMIN — Medication 650 MILLIGRAM(S): at 06:43

## 2021-05-26 RX ADMIN — SODIUM CHLORIDE 125 MILLILITER(S): 9 INJECTION INTRAMUSCULAR; INTRAVENOUS; SUBCUTANEOUS at 19:03

## 2021-05-26 RX ADMIN — SODIUM CHLORIDE 125 MILLILITER(S): 9 INJECTION INTRAMUSCULAR; INTRAVENOUS; SUBCUTANEOUS at 13:02

## 2021-05-26 RX ADMIN — ENOXAPARIN SODIUM 40 MILLIGRAM(S): 100 INJECTION SUBCUTANEOUS at 12:05

## 2021-05-26 RX ADMIN — CLOPIDOGREL BISULFATE 75 MILLIGRAM(S): 75 TABLET, FILM COATED ORAL at 12:05

## 2021-05-26 RX ADMIN — SODIUM CHLORIDE 125 MILLILITER(S): 9 INJECTION INTRAMUSCULAR; INTRAVENOUS; SUBCUTANEOUS at 00:01

## 2021-05-26 RX ADMIN — Medication 81 MILLIGRAM(S): at 12:04

## 2021-05-26 RX ADMIN — ATORVASTATIN CALCIUM 80 MILLIGRAM(S): 80 TABLET, FILM COATED ORAL at 21:39

## 2021-05-26 RX ADMIN — SODIUM CHLORIDE 125 MILLILITER(S): 9 INJECTION INTRAMUSCULAR; INTRAVENOUS; SUBCUTANEOUS at 06:23

## 2021-05-26 NOTE — PROGRESS NOTE ADULT - SUBJECTIVE AND OBJECTIVE BOX
KACI LYLES 56y Male  MRN#: [id number]   Hospital Day: 2d    SUBJECTIVE  Patient is a 56y old Male who presents with a chief complaint of LUE numbness (25 May 2021 00:23)  Currently admitted to medicine with the primary diagnosis of CVA (cerebral vascular accident)      INTERVAL HPI AND OVERNIGHT EVENTS:  Patient was examined and seen at bedside. This morning he is resting comfortably in bed and reports no issues or overnight events.    REVIEW OF SYMPTOMS:  CONSTITUTIONAL: No weakness, fevers or chills; No headaches  EYES: No visual changes, eye pain, or discharge  ENT: No vertigo; No ear pain or change in hearing; No sore throat or difficulty swallowing  NECK: No pain or stiffness  RESPIRATORY: No cough, wheezing, or hemoptysis; No shortness of breath  CARDIOVASCULAR: No chest pain or palpitations  GASTROINTESTINAL: No abdominal or epigastric pain; No nausea, vomiting, or hematemesis; No diarrhea or constipation; No melena or hematochezia  GENITOURINARY: No dysuria, frequency or hematuria  MUSCULOSKELETAL: No joint pain, no muscle pain, no weakness  NEUROLOGICAL: No numbness or weakness  SKIN: No itching or rashes    OBJECTIVE  PAST MEDICAL & SURGICAL HISTORY  No pertinent past medical history      ALLERGIES:  No Known Allergies    MEDICATIONS:  STANDING MEDICATIONS  aspirin  chewable 81 milliGRAM(s) Oral daily  atorvastatin 80 milliGRAM(s) Oral at bedtime  chlorhexidine 4% Liquid 1 Application(s) Topical once  clopidogrel Tablet 75 milliGRAM(s) Oral daily  enoxaparin Injectable 40 milliGRAM(s) SubCutaneous daily  sodium chloride 0.9%. 1000 milliLiter(s) IV Continuous <Continuous>    PRN MEDICATIONS  acetaminophen   Tablet .. 650 milliGRAM(s) Oral every 6 hours PRN      VITAL SIGNS: Last 24 Hours  T(C): 36.6 (26 May 2021 04:58), Max: 36.8 (25 May 2021 15:31)  T(F): 97.9 (26 May 2021 04:58), Max: 98.2 (25 May 2021 15:31)  HR: 69 (26 May 2021 04:58) (64 - 92)  BP: 107/75 (26 May 2021 04:58) (107/75 - 144/86)  BP(mean): --  RR: 18 (26 May 2021 04:58) (18 - 18)  SpO2: 98% (26 May 2021 04:58) (96% - 98%)    LABS:                        14.1   5.47  )-----------( 251      ( 26 May 2021 05:29 )             42.2     05-26    141  |  107  |  7<L>  ----------------------------<  97  4.1   |  26  |  0.7    Ca    8.6      26 May 2021 05:29  Mg     2.2     05-25    TPro  6.0  /  Alb  3.9  /  TBili  0.4  /  DBili  x   /  AST  18  /  ALT  25  /  AlkPhos  76  05-26    PT/INR - ( 25 May 2021 05:20 )   PT: 12.90 sec;   INR: 1.12 ratio         PTT - ( 25 May 2021 05:20 )  PTT:29.8 sec          CARDIAC MARKERS ( 24 May 2021 20:10 )  x     / <0.01 ng/mL / x     / x     / x          RADIOLOGY:      PHYSICAL EXAM:  CONSTITUTIONAL: No acute distress, well-developed, well-groomed, AAOx3  HEAD: Atraumatic, normocephalic  EYES: EOM intact, PERRLA, conjunctiva and sclera clear  ENT: Supple, no masses, no thyromegaly, no bruits, no JVD; moist mucous membranes  PULMONARY: Clear to auscultation bilaterally; no wheezes, rales, or rhonchi  CARDIOVASCULAR: Regular rate and rhythm; no murmurs, rubs, or gallops  GASTROINTESTINAL: Soft, non-tender, non-distended; bowel sounds present  MUSCULOSKELETAL: 2+ peripheral pulses; no clubbing, no cyanosis, no edema  NEUROLOGY: non-focal  SKIN: No rashes or lesions; warm and dry

## 2021-05-27 LAB
ALBUMIN SERPL ELPH-MCNC: 3.9 G/DL — SIGNIFICANT CHANGE UP (ref 3.5–5.2)
ALP SERPL-CCNC: 70 U/L — SIGNIFICANT CHANGE UP (ref 30–115)
ALT FLD-CCNC: 22 U/L — SIGNIFICANT CHANGE UP (ref 0–41)
ANION GAP SERPL CALC-SCNC: 9 MMOL/L — SIGNIFICANT CHANGE UP (ref 7–14)
AST SERPL-CCNC: 17 U/L — SIGNIFICANT CHANGE UP (ref 0–41)
AT III ACT/NOR PPP CHRO: 82 % — LOW (ref 85–135)
AT III AG PPP IA-MCNC: 23 MG/DL — SIGNIFICANT CHANGE UP (ref 19–31)
BASOPHILS # BLD AUTO: 0.06 K/UL — SIGNIFICANT CHANGE UP (ref 0–0.2)
BASOPHILS NFR BLD AUTO: 1.1 % — HIGH (ref 0–1)
BILIRUB SERPL-MCNC: 0.6 MG/DL — SIGNIFICANT CHANGE UP (ref 0.2–1.2)
BUN SERPL-MCNC: 8 MG/DL — LOW (ref 10–20)
CALCIUM SERPL-MCNC: 8.9 MG/DL — SIGNIFICANT CHANGE UP (ref 8.5–10.1)
CHLORIDE SERPL-SCNC: 106 MMOL/L — SIGNIFICANT CHANGE UP (ref 98–110)
CO2 SERPL-SCNC: 26 MMOL/L — SIGNIFICANT CHANGE UP (ref 17–32)
CREAT SERPL-MCNC: 0.9 MG/DL — SIGNIFICANT CHANGE UP (ref 0.7–1.5)
EOSINOPHIL # BLD AUTO: 0.08 K/UL — SIGNIFICANT CHANGE UP (ref 0–0.7)
EOSINOPHIL NFR BLD AUTO: 1.4 % — SIGNIFICANT CHANGE UP (ref 0–8)
GLUCOSE SERPL-MCNC: 91 MG/DL — SIGNIFICANT CHANGE UP (ref 70–99)
HCT VFR BLD CALC: 42 % — SIGNIFICANT CHANGE UP (ref 42–52)
HCYS SERPL-MCNC: 10.5 UMOL/L — SIGNIFICANT CHANGE UP
HGB BLD-MCNC: 13.8 G/DL — LOW (ref 14–18)
IMM GRANULOCYTES NFR BLD AUTO: 0.4 % — HIGH (ref 0.1–0.3)
LYMPHOCYTES # BLD AUTO: 2 K/UL — SIGNIFICANT CHANGE UP (ref 1.2–3.4)
LYMPHOCYTES # BLD AUTO: 35.6 % — SIGNIFICANT CHANGE UP (ref 20.5–51.1)
MAGNESIUM SERPL-MCNC: 2 MG/DL — SIGNIFICANT CHANGE UP (ref 1.8–2.4)
MCHC RBC-ENTMCNC: 27.8 PG — SIGNIFICANT CHANGE UP (ref 27–31)
MCHC RBC-ENTMCNC: 32.9 G/DL — SIGNIFICANT CHANGE UP (ref 32–37)
MCV RBC AUTO: 84.5 FL — SIGNIFICANT CHANGE UP (ref 80–94)
MONOCYTES # BLD AUTO: 0.41 K/UL — SIGNIFICANT CHANGE UP (ref 0.1–0.6)
MONOCYTES NFR BLD AUTO: 7.3 % — SIGNIFICANT CHANGE UP (ref 1.7–9.3)
NEUTROPHILS # BLD AUTO: 3.05 K/UL — SIGNIFICANT CHANGE UP (ref 1.4–6.5)
NEUTROPHILS NFR BLD AUTO: 54.2 % — SIGNIFICANT CHANGE UP (ref 42.2–75.2)
NRBC # BLD: 0 /100 WBCS — SIGNIFICANT CHANGE UP (ref 0–0)
PLATELET # BLD AUTO: 263 K/UL — SIGNIFICANT CHANGE UP (ref 130–400)
POTASSIUM SERPL-MCNC: 4.2 MMOL/L — SIGNIFICANT CHANGE UP (ref 3.5–5)
POTASSIUM SERPL-SCNC: 4.2 MMOL/L — SIGNIFICANT CHANGE UP (ref 3.5–5)
PROT SERPL-MCNC: 6 G/DL — SIGNIFICANT CHANGE UP (ref 6–8)
RBC # BLD: 4.97 M/UL — SIGNIFICANT CHANGE UP (ref 4.7–6.1)
RBC # FLD: 13 % — SIGNIFICANT CHANGE UP (ref 11.5–14.5)
SARS-COV-2 RNA SPEC QL NAA+PROBE: SIGNIFICANT CHANGE UP
SODIUM SERPL-SCNC: 141 MMOL/L — SIGNIFICANT CHANGE UP (ref 135–146)
WBC # BLD: 5.62 K/UL — SIGNIFICANT CHANGE UP (ref 4.8–10.8)
WBC # FLD AUTO: 5.62 K/UL — SIGNIFICANT CHANGE UP (ref 4.8–10.8)

## 2021-05-27 PROCEDURE — 99231 SBSQ HOSP IP/OBS SF/LOW 25: CPT

## 2021-05-27 PROCEDURE — 99222 1ST HOSP IP/OBS MODERATE 55: CPT

## 2021-05-27 RX ADMIN — SODIUM CHLORIDE 125 MILLILITER(S): 9 INJECTION INTRAMUSCULAR; INTRAVENOUS; SUBCUTANEOUS at 21:54

## 2021-05-27 RX ADMIN — SODIUM CHLORIDE 125 MILLILITER(S): 9 INJECTION INTRAMUSCULAR; INTRAVENOUS; SUBCUTANEOUS at 12:52

## 2021-05-27 RX ADMIN — Medication 650 MILLIGRAM(S): at 06:12

## 2021-05-27 RX ADMIN — Medication 81 MILLIGRAM(S): at 11:04

## 2021-05-27 RX ADMIN — ATORVASTATIN CALCIUM 80 MILLIGRAM(S): 80 TABLET, FILM COATED ORAL at 21:53

## 2021-05-27 RX ADMIN — ENOXAPARIN SODIUM 40 MILLIGRAM(S): 100 INJECTION SUBCUTANEOUS at 11:04

## 2021-05-27 RX ADMIN — CLOPIDOGREL BISULFATE 75 MILLIGRAM(S): 75 TABLET, FILM COATED ORAL at 11:04

## 2021-05-27 NOTE — PROGRESS NOTE ADULT - ATTENDING COMMENTS
acute right mca embolic stroke with neglect and left sensory symptoms.   awaiting ISRAEL and loop
patient seen and examined earlier with the team on rounds.  daughter at bedside and was  at patient's request.  patient has no new neurologic s/s feels he may be slightly improving.  Vital Signs Last 24 Hrs  T(C): 35.6 (27 May 2021 13:03), Max: 36.7 (26 May 2021 20:56)  T(F): 96.1 (27 May 2021 13:03), Max: 98 (26 May 2021 20:56)  HR: 64 (27 May 2021 13:03) (64 - 87)  BP: 106/70 (27 May 2021 13:03) (105/62 - 120/76)  RR: 18 (27 May 2021 13:03) (16 - 18)  conj pink, no jaundice  neck supple. no JVD. no carotid bruits  lungs clear to auscultation with good air entry bilaterally  heart normal S1, S2, no murmur heard no gallop  abd. soft nontender. no hepatosplenomegaly  extremities no edema.  motor strength 5/5, maybe 4.5/5 LUE but very slight if present.                        13.8   5.62  )-----------( 263      ( 27 May 2021 04:30 )             42.0   05-27    141  |  106  |  8<L>  ----------------------------<  91  4.2   |  26  |  0.9    Ca    8.9      27 May 2021 04:30  Mg     2.0     05-27    TPro  6.0  /  Alb  3.9  /  TBili  0.6  /  DBili  x   /  AST  17  /  ALT  22  /  AlkPhos  70  05-27    a: CVA in 55 y/o with no underlying medical issues.  temporally patient had Moderna COVID vaccine 2 days before but this has not been associated with CVA.      P:  ISRAEL/hypercoagulable workup/telemetry/ may need loop recorder  I will fill out VAERS form also.  rest of A & P outlined in resident note.
Patient doing well. awaiting ISRAEL and loop.

## 2021-05-27 NOTE — CONSULT NOTE ADULT - ASSESSMENT
56 YO M, [place]an speaking, w/ no sig PMHx presented from Urgent Care w/ LUE numbness that started on the morning of presentation.  Cardiology consulted for ISRAEL as part of stroke workup    IMPRESSION:  - CVA; Subacute right parietotemporal region infarct. Currently on ASA 81mg PO QD, Plavix 75mg PO QD and Lipitor 80mg PO QHS   - Echo (05/26/2021): EF 55 - 60 %. Trace MR. Mild TR    PLAN:  - ISRAEL in AM  - NPO after midnight   - COVID-19 PCR    **Final note pending discussion with cardiology attending** 54 YO M, [place] speaking, w/ no sig PMHx presented from Urgent Care w/ LUE numbness that started on the morning of presentation.  Cardiology consulted for ISRAEL as part of stroke workup    IMPRESSION:  - CVA; Subacute right parietotemporal region infarct. Currently on ASA 81mg PO QD, Plavix 75mg PO QD and Lipitor 80mg PO QHS   - Echo (05/26/2021): EF 55 - 60 %. Trace MR. Mild TR    PLAN:  - ISRAEL in AM  - NPO after midnight   - COVID-19 PCR sent at 4:20 PM on 05/27/2021  - Patient preferred daughter for translation. Spoke at length with the daughter over the phone and answered all questions and concerns.

## 2021-05-27 NOTE — PROGRESS NOTE ADULT - SUBJECTIVE AND OBJECTIVE BOX
KACI LYLES 56y Male  MRN#: [id number]   Hospital Day: 3d    SUBJECTIVE  Patient is a 56y old Male who presents with a chief complaint of LUE numbness (26 May 2021 12:11)  Currently admitted to medicine with the primary diagnosis of CVA (cerebral vascular accident)      INTERVAL HPI AND OVERNIGHT EVENTS:  Patient was examined and seen at bedside. This morning he is resting comfortably in bed and reports no issues or overnight events.    REVIEW OF SYMPTOMS:  CONSTITUTIONAL: No weakness, fevers or chills; No headaches  EYES: No visual changes, eye pain, or discharge  ENT: No vertigo; No ear pain or change in hearing; No sore throat or difficulty swallowing  NECK: No pain or stiffness  RESPIRATORY: No cough, wheezing, or hemoptysis; No shortness of breath  CARDIOVASCULAR: No chest pain or palpitations  GASTROINTESTINAL: No abdominal or epigastric pain; No nausea, vomiting, or hematemesis; No diarrhea or constipation; No melena or hematochezia  GENITOURINARY: No dysuria, frequency or hematuria  MUSCULOSKELETAL: No joint pain, no muscle pain, no weakness  NEUROLOGICAL: No numbness or weakness  SKIN: No itching or rashes    OBJECTIVE  PAST MEDICAL & SURGICAL HISTORY  No pertinent past medical history      ALLERGIES:  No Known Allergies    MEDICATIONS:  STANDING MEDICATIONS  aspirin  chewable 81 milliGRAM(s) Oral daily  atorvastatin 80 milliGRAM(s) Oral at bedtime  chlorhexidine 4% Liquid 1 Application(s) Topical once  clopidogrel Tablet 75 milliGRAM(s) Oral daily  enoxaparin Injectable 40 milliGRAM(s) SubCutaneous daily  sodium chloride 0.9%. 1000 milliLiter(s) IV Continuous <Continuous>    PRN MEDICATIONS  acetaminophen   Tablet .. 650 milliGRAM(s) Oral every 6 hours PRN      VITAL SIGNS: Last 24 Hours  T(C): 36.2 (27 May 2021 05:10), Max: 36.7 (26 May 2021 20:56)  T(F): 97.2 (27 May 2021 05:10), Max: 98 (26 May 2021 20:56)  HR: 72 (27 May 2021 05:10) (68 - 87)  BP: 105/62 (27 May 2021 05:10) (105/62 - 120/76)  BP(mean): --  RR: 18 (27 May 2021 05:10) (16 - 18)  SpO2: --    LABS:                        13.8   5.62  )-----------( 263      ( 27 May 2021 04:30 )             42.0     05-27    141  |  106  |  8<L>  ----------------------------<  91  4.2   |  26  |  0.9    Ca    8.9      27 May 2021 04:30  Mg     2.0     05-27    TPro  6.0  /  Alb  3.9  /  TBili  0.6  /  DBili  x   /  AST  17  /  ALT  22  /  AlkPhos  70  05-27                  RADIOLOGY:      PHYSICAL EXAM:  CONSTITUTIONAL: No acute distress, well-developed, well-groomed, AAOx3  HEAD: Atraumatic, normocephalic  EYES: EOM intact, PERRLA, conjunctiva and sclera clear  ENT: Supple, no masses, no thyromegaly, no bruits, no JVD; moist mucous membranes  PULMONARY: Clear to auscultation bilaterally; no wheezes, rales, or rhonchi  CARDIOVASCULAR: Regular rate and rhythm; no murmurs, rubs, or gallops  GASTROINTESTINAL: Soft, non-tender, non-distended; bowel sounds present  MUSCULOSKELETAL: 2+ peripheral pulses; no clubbing, no cyanosis, no edema  NEUROLOGY: non-focal  SKIN: No rashes or lesions; warm and dry

## 2021-05-27 NOTE — PROGRESS NOTE ADULT - SUBJECTIVE AND OBJECTIVE BOX
Stroke Progress Note:    KACI LYLES    1. Chief Complaint:   LUE numbnessHPI:  56 yo M w/ no pmhx presents from Urgent Care w/ LUE numbness that started this AM ~6AM.   Patient received 2nd dose of Moderna COVID Vaccine ~10days ago.     Denies dysarthria, aphasia, visual or other sensory defects.    ED course: *** Code stroke called ***   - VS wnl  - CXR not impressive  - MR Brain: Subacute appearing infarct in the right parietotemporal region.    - CTA H&N: Focal nonocclusive thrombus of the right inferior division M2 segment of the MCA with reconstitution distally.  - Seen by Neurology>> Out of window for tPA & not a candidate for emergent neuroendovascular intervention given low NIHSS score consistent with non-disabling deficits.   - Received 325mg Aspirin x1    Admit to Stroke Unit.    (25 May 2021 00:23)      2. Relevant PMH:   Prior ischemic stroke/TIA[ ], Afib [ ], CAD [ ], HTN [ ], DLD [ ], DM [ ], PVD [ ], Obesity [ ],   Sedentary lifestyle [ ], CHF [ ], [place] [ ], Cancer Hx [ ].    3. Social History: Smoking [ ], Drug Use [ ], Alcohol Use [ ], Other [ ]    4. Possible Location of Stroke:  see below  5. Relevant Brain Tissue Imaging:  < from: MR Head w/wo IV Cont (05.25.21 @ 19:20) >  IMPRESSION:    Focal acute infarcts in a right MCA distribution without evidence transformation.    No abnormal intracranial enhancement.        < end of copied text >    6. Relevant Cerebrovascular Imaging:   CT Angio Neck w/ IV Cont:   EXAM:  CT ANGIO NECK (W)AW IC        EXAM:  CT ANGIO BRAIN (W)AW IC          IMPRESSION:  Focal nonocclusive thrombus of the right inferior division M2 segment of the MCA with reconstitution distally. Distal MCA cortical branch vessels are well opacified.    Intracranial vessels are otherwise unremarkable without evidence of vessel malformation or aneurysm.    No carotid or vertebral artery stenosis.  7. Relevant blood tests:    `LDL Cholesterol Calculated: 125 mg/dL (05.25.21 @ 05:20) A1C with Estimated Average Glucose Result: 5.4: Method: Immunoassay   8. Relevant cardiac rhythm monitoring:  no reported events  9. Relevant Cardiac Structure: (TTE/ISRAEL +/-):[ ]No intracardiac thrombus/[ ] no vegetation/[ ]no [place]/EF:  `< from: TTE Echo Complete w/o Contrast w/ Doppler (05.26.21 @ 12:22) >      Summary:   1. Left ventricular ejection fraction, by visual estimation, is 55 to 60%.   2. Trace mitral valve regurgitation.   3. Mild tricuspid regurgitation.      < end of copied text >    Home Medications:      MEDICATIONS  (STANDING):  aspirin  chewable 81 milliGRAM(s) Oral daily  atorvastatin 80 milliGRAM(s) Oral at bedtime  chlorhexidine 4% Liquid 1 Application(s) Topical once  clopidogrel Tablet 75 milliGRAM(s) Oral daily  enoxaparin Injectable 40 milliGRAM(s) SubCutaneous daily  sodium chloride 0.9%. 1000 milliLiter(s) (125 mL/Hr) IV Continuous <Continuous>      10. PT/OT/Speech/Rehab/S&Sw/ Cognitive eval results and recommendations:  pending  11. Exam:    Vital Signs Last 24 Hrs  T(C): 35.6 (27 May 2021 13:03), Max: 36.7 (26 May 2021 20:56)  T(F): 96.1 (27 May 2021 13:03), Max: 98 (26 May 2021 20:56)  HR: 64 (27 May 2021 13:03) (64 - 87)  BP: 106/70 (27 May 2021 13:03) (105/62 - 120/76)  BP(mean): --  RR: 18 (27 May 2021 13:03) (16 - 18)  SpO2: --    12.   NIH STROKE SCALE  Item	                                                        Score  1 a.	Level of Consciousness	               	0  1 b. LOC Questions	                                0  1 c.	LOC Commands	                               	0  2.	Best Gaze	                                        0  3.	Visual	                                                0  4.	Facial Palsy	                                        0  5 a.	Motor Arm - Left	                                0  5 b.	Motor Arm - Right	                        0  6 a.	Motor Leg - Left	                                0  6 b.	Motor Leg - Right	                                0  7.	Limb Ataxia	                                        0  8.	Sensory	                                                0  9.	Language	                                        0  10.	Dysarthria	                                        0  11.	Extinction and Inattention  	        0  ______________________________________  TOTAL	                                                        0      mRS:0  0 No symptoms at all  1 No significant disability despite symptoms; able to carry out all usual duties and activities without assistance  2 Slight disability; unable to carry out all previous activities, but able to look after own affairs  3 Moderate disability; requiring some help, but able to walk without assistance  4 Moderately severe disability; unable to walk without assistance and unable to attend to own bodily needs without assistance  5 Severe disability; bedridden, incontinent and requiring constant nursing care and attention  6 Dead

## 2021-05-27 NOTE — CONSULT NOTE ADULT - SUBJECTIVE AND OBJECTIVE BOX
Date of Admission: 05-24-21    CHIEF COMPLAINT: [place]    HISTORY OF PRESENT ILLNESS:   54 YO M, Romansh speaking, w/ no sig PMHx presented from Urgent Care w/ LUE numbness that started on the morning of presentation. Patient received 2nd dose of Moderna COVID-19 Vaccine 10 days before presentation.   In the ED Code stroke called. MR Brain showed Subacute appearing infarct in the right parietotemporal region. CTA H&N showed Focal nonocclusive thrombus of the right inferior division M2 segment of the MCA with reconstitution distally.  Out of window for tPA & not a candidate for emergent neuroendovascular intervention given low NIHSS score  Received 325mg Aspirin x1 and started on ASA 81mg PO QD  Denies any prior cardiac history. Never followed with a cardiologist     PAST MEDICAL & SURGICAL HISTORY:  No pertinent past medical history    HEALTH ISSUES - PROBLEM Dx:    FAMILY HISTORY:  None [X]     SOCIAL HISTORY:    [X] Non-smoker  [X] Drinks Alcohol socially     Allergies    No Known Allergies    Intolerances	    REVIEW OF SYMPTOMS:  CONSTITUTIONAL: No weakness, fevers or chills; No headaches  EYES: No visual changes, eye pain, or discharge  ENT: No vertigo; No ear pain or change in hearing; No sore throat or difficulty swallowing  NECK: No pain or stiffness  RESPIRATORY: No cough, wheezing, or hemoptysis; No shortness of breath  CARDIOVASCULAR: No chest pain or palpitations  GASTROINTESTINAL: No abdominal or epigastric pain; No nausea, vomiting, or hematemesis; No diarrhea or constipation; No melena or hematochezia  GENITOURINARY: No dysuria, frequency or hematuria  MUSCULOSKELETAL: No joint pain, no muscle pain, no weakness  NEUROLOGICAL: No numbness or weakness  SKIN: No itching or rashes    VITALS:  T(C): 35.6 (05-27-21 @ 13:03), Max: 36.7 (05-26-21 @ 20:56)  HR: 64 (05-27-21 @ 13:03) (64 - 72)  BP: 106/70 (05-27-21 @ 13:03) (105/62 - 119/61)  RR: 18 (05-27-21 @ 13:03) (16 - 18)  SpO2: --  Wt(kg): --  I&O's Summary    27 May 2021 07:01  -  27 May 2021 14:45  --------------------------------------------------------  IN: 725 mL / OUT: 0 mL / NET: 725 mL      Daily     Daily     PHYSICAL EXAM:  CONSTITUTIONAL: No acute distress, well-developed, well-groomed, AAOx3  HEAD: Atraumatic, normocephalic  EYES: EOM intact, PERRLA, conjunctiva and sclera clear  ENT: Supple, no masses, no thyromegaly, no bruits, no JVD; moist mucous membranes  PULMONARY: Clear to auscultation bilaterally; no wheezes, rales, or rhonchi  CARDIOVASCULAR: Regular rate and rhythm; no murmurs, rubs, or gallops  GASTROINTESTINAL: Soft, non-tender, non-distended; bowel sounds present  MUSCULOSKELETAL: 2+ peripheral pulses; no clubbing, no cyanosis, no edema  NEUROLOGY: non-focal  SKIN: No rashes or lesions; warm and dry    LABS:	 	                          13.8   5.62  )-----------( 263      ( 27 May 2021 04:30 )             42.0     05-27    141  |  106  |  8<L>  ----------------------------<  91  4.2   |  26  |  0.9    Ca    8.9      27 May 2021 04:30  Mg     2.0     05-27    TPro  6.0  /  Alb  3.9  /  TBili  0.6  /  DBili  x   /  AST  17  /  ALT  22  /  AlkPhos  70  05-27    CARDIAC MARKERS:  Troponin trend: <0.01 (24 May 2021 20:10)    TELEMETRY EVENTS:  Not on tele      ECG:  < from: 12 Lead ECG (05.24.21 @ 20:55) >  Diagnosis Line Normal sinus rhythm  Normal ECG  < end of copied text >    RADIOLOGY:  < from: MR Head w/wo IV Cont (05.25.21 @ 19:20) >  Focal acute infarcts in a right MCA distribution without evidence transformation.  No abnormal intracranial enhancement.  < end of copied text >    Echocardiogram:  < from: TTE Echo Complete w/o Contrast w/ Doppler (05.26.21 @ 12:22) >   1. Left ventricular ejection fraction, by visual estimation, is 55 to 60%.   2. Trace mitral valve regurgitation.   3. Mild tricuspid regurgitation.    PHYSICIAN INTERPRETATION:  Left Ventricle: Normal left ventricular size and wall thicknesses, with normal systolic and diastolic function. Left ventricular ejection fraction, by visual estimation, is 55 to 60%.  Right Ventricle: Normal right ventricular size and function.  Pericardium: There is no evidence of pericardial effusion.  Mitral Valve: Structurally normal mitral valve, with normal leaflet excursion. Trace mitral valve regurgitation is seen.  Tricuspid Valve: Structurally normal tricuspid valve, with normal leaflet excursion. Mild tricuspid regurgitation is visualized.  Aortic Valve: The aorticvalve is trileaflet. No evidence of aortic stenosis. Aortic valve thickening with normal leaflet opening. No evidence of aortic valve regurgitation is seen.  Pulmonic Valve: Structurally normal pulmonic valve, with normal leaflet excursion. No indication of pulmonic valve regurgitation.  Aorta: The aortic root and ascending aorta are structurally normal, with no evidence of dilitation.  Pulmonary Artery: The main pulmonary artery is normal in size.  Venous: The inferior vena cava was normal sized, with respiratory size variation greater than 50%.      Catheterization: None    Stress Test: None  	    Home Medications:    MEDICATIONS  (STANDING):  aspirin  chewable 81 milliGRAM(s) Oral daily  atorvastatin 80 milliGRAM(s) Oral at bedtime  chlorhexidine 4% Liquid 1 Application(s) Topical once  clopidogrel Tablet 75 milliGRAM(s) Oral daily  enoxaparin Injectable 40 milliGRAM(s) SubCutaneous daily  sodium chloride 0.9%. 1000 milliLiter(s) (125 mL/Hr) IV Continuous <Continuous>    MEDICATIONS  (PRN):  acetaminophen   Tablet .. 650 milliGRAM(s) Oral every 6 hours PRN Temp greater or equal to 38C (100.4F), Mild Pain (1 - 3)         Date of Admission: 05-24-21    CHIEF COMPLAINT: [place]    HISTORY OF PRESENT ILLNESS:   54 YO M, Croatian speaking, w/ no sig PMHx presented from Urgent Care w/ LUE numbness that started on the morning of presentation. Patient received 2nd dose of Moderna COVID-19 Vaccine 10 days before presentation.   In the ED Code stroke called. MR Brain showed Subacute appearing infarct in the right parietotemporal region. CTA H&N showed Focal nonocclusive thrombus of the right inferior division M2 segment of the MCA with reconstitution distally.  Out of window for tPA & not a candidate for emergent neuroendovascular intervention given low NIHSS score  Received 325mg Aspirin x1 and started on ASA 81mg PO QD  Denies any prior cardiac history. Never followed with a cardiologist   Cardiology consulted for ISRAEL    PAST MEDICAL & SURGICAL HISTORY:  No pertinent past medical history    HEALTH ISSUES - PROBLEM Dx:    FAMILY HISTORY:  None [X]     SOCIAL HISTORY:    [X] Non-smoker  [X] Drinks Alcohol socially     Allergies    No Known Allergies    Intolerances	    REVIEW OF SYMPTOMS:  CONSTITUTIONAL: No weakness, fevers or chills; No headaches  EYES: No visual changes, eye pain, or discharge  ENT: No vertigo; No ear pain or change in hearing; No sore throat or difficulty swallowing  NECK: No pain or stiffness  RESPIRATORY: No cough, wheezing, or hemoptysis; No shortness of breath  CARDIOVASCULAR: No chest pain or palpitations  GASTROINTESTINAL: No abdominal or epigastric pain; No nausea, vomiting, or hematemesis; No diarrhea or constipation; No melena or hematochezia  GENITOURINARY: No dysuria, frequency or hematuria  MUSCULOSKELETAL: No joint pain, no muscle pain, no weakness  NEUROLOGICAL: No numbness or weakness  SKIN: No itching or rashes    VITALS:  T(C): 35.6 (05-27-21 @ 13:03), Max: 36.7 (05-26-21 @ 20:56)  HR: 64 (05-27-21 @ 13:03) (64 - 72)  BP: 106/70 (05-27-21 @ 13:03) (105/62 - 119/61)  RR: 18 (05-27-21 @ 13:03) (16 - 18)  SpO2: --  Wt(kg): --  I&O's Summary    27 May 2021 07:01  -  27 May 2021 14:45  --------------------------------------------------------  IN: 725 mL / OUT: 0 mL / NET: 725 mL      Daily     Daily     PHYSICAL EXAM:  CONSTITUTIONAL: No acute distress, well-developed, well-groomed, AAOx3  HEAD: Atraumatic, normocephalic  EYES: EOM intact, PERRLA, conjunctiva and sclera clear  ENT: Supple, no masses, no thyromegaly, no bruits, no JVD; moist mucous membranes  PULMONARY: Clear to auscultation bilaterally; no wheezes, rales, or rhonchi  CARDIOVASCULAR: Regular rate and rhythm; no murmurs, rubs, or gallops  GASTROINTESTINAL: Soft, non-tender, non-distended; bowel sounds present  MUSCULOSKELETAL: 2+ peripheral pulses; no clubbing, no cyanosis, no edema  NEUROLOGY: non-focal  SKIN: No rashes or lesions; warm and dry    LABS:	 	                          13.8   5.62  )-----------( 263      ( 27 May 2021 04:30 )             42.0     05-27    141  |  106  |  8<L>  ----------------------------<  91  4.2   |  26  |  0.9    Ca    8.9      27 May 2021 04:30  Mg     2.0     05-27    TPro  6.0  /  Alb  3.9  /  TBili  0.6  /  DBili  x   /  AST  17  /  ALT  22  /  AlkPhos  70  05-27    CARDIAC MARKERS:  Troponin trend: <0.01 (24 May 2021 20:10)    TELEMETRY EVENTS:  Not on tele      ECG:  < from: 12 Lead ECG (05.24.21 @ 20:55) >  Diagnosis Line Normal sinus rhythm  Normal ECG  < end of copied text >    RADIOLOGY:  < from: MR Head w/wo IV Cont (05.25.21 @ 19:20) >  Focal acute infarcts in a right MCA distribution without evidence transformation.  No abnormal intracranial enhancement.  < end of copied text >    Echocardiogram:  < from: TTE Echo Complete w/o Contrast w/ Doppler (05.26.21 @ 12:22) >   1. Left ventricular ejection fraction, by visual estimation, is 55 to 60%.   2. Trace mitral valve regurgitation.   3. Mild tricuspid regurgitation.    PHYSICIAN INTERPRETATION:  Left Ventricle: Normal left ventricular size and wall thicknesses, with normal systolic and diastolic function. Left ventricular ejection fraction, by visual estimation, is 55 to 60%.  Right Ventricle: Normal right ventricular size and function.  Pericardium: There is no evidence of pericardial effusion.  Mitral Valve: Structurally normal mitral valve, with normal leaflet excursion. Trace mitral valve regurgitation is seen.  Tricuspid Valve: Structurally normal tricuspid valve, with normal leaflet excursion. Mild tricuspid regurgitation is visualized.  Aortic Valve: The aorticvalve is trileaflet. No evidence of aortic stenosis. Aortic valve thickening with normal leaflet opening. No evidence of aortic valve regurgitation is seen.  Pulmonic Valve: Structurally normal pulmonic valve, with normal leaflet excursion. No indication of pulmonic valve regurgitation.  Aorta: The aortic root and ascending aorta are structurally normal, with no evidence of dilitation.  Pulmonary Artery: The main pulmonary artery is normal in size.  Venous: The inferior vena cava was normal sized, with respiratory size variation greater than 50%.      Catheterization: None    Stress Test: None  	    Home Medications:    MEDICATIONS  (STANDING):  aspirin  chewable 81 milliGRAM(s) Oral daily  atorvastatin 80 milliGRAM(s) Oral at bedtime  chlorhexidine 4% Liquid 1 Application(s) Topical once  clopidogrel Tablet 75 milliGRAM(s) Oral daily  enoxaparin Injectable 40 milliGRAM(s) SubCutaneous daily  sodium chloride 0.9%. 1000 milliLiter(s) (125 mL/Hr) IV Continuous <Continuous>    MEDICATIONS  (PRN):  acetaminophen   Tablet .. 650 milliGRAM(s) Oral every 6 hours PRN Temp greater or equal to 38C (100.4F), Mild Pain (1 - 3)         Date of Admission: 05-24-21    CHIEF COMPLAINT: [place]    HISTORY OF PRESENT ILLNESS:   56 YO M, Syriac speaking, w/ no sig PMHx presented from Urgent Care w/ LUE numbness that started on the morning of presentation. Patient received 2nd dose of Moderna COVID-19 Vaccine 10 days before presentation.   In the ED Code stroke called. MR Brain showed Subacute appearing infarct in the right parietotemporal region. CTA H&N showed Focal nonocclusive thrombus of the right inferior division M2 segment of the MCA with reconstitution distally.  Out of window for tPA & not a candidate for emergent neuroendovascular intervention given low NIHSS score  Received 325mg Aspirin x1 and started on ASA 81mg PO QD  Denies any prior cardiac history. Never followed with a cardiologist   Cardiology consulted for [place]    PAST MEDICAL & SURGICAL HISTORY:  No pertinent past medical history    HEALTH ISSUES - PROBLEM Dx:    FAMILY HISTORY:  None [X]     SOCIAL HISTORY:    [X] Non-smoker  [X] Drinks Alcohol socially     Allergies    No Known Allergies    Intolerances	    REVIEW OF SYMPTOMS:  CONSTITUTIONAL: No weakness, fevers or chills; No headaches  EYES: No visual changes, eye pain, or discharge  ENT: No vertigo; No ear pain or change in hearing; No sore throat or difficulty swallowing  NECK: No pain or stiffness  RESPIRATORY: No cough, wheezing, or hemoptysis; No shortness of breath  CARDIOVASCULAR: No chest pain or palpitations  GASTROINTESTINAL: No abdominal or epigastric pain; No nausea, vomiting, or hematemesis; No diarrhea or constipation; No melena or hematochezia  GENITOURINARY: No dysuria, frequency or hematuria  MUSCULOSKELETAL: No joint pain, no muscle pain, no weakness  NEUROLOGICAL: No numbness or weakness  SKIN: No itching or rashes    VITALS:  T(C): 35.6 (05-27-21 @ 13:03), Max: 36.7 (05-26-21 @ 20:56)  HR: 64 (05-27-21 @ 13:03) (64 - 72)  BP: 106/70 (05-27-21 @ 13:03) (105/62 - 119/61)  RR: 18 (05-27-21 @ 13:03) (16 - 18)  SpO2: --  Wt(kg): --  I&O's Summary    27 May 2021 07:01  -  27 May 2021 14:45  --------------------------------------------------------  IN: 725 mL / OUT: 0 mL / NET: 725 mL      Daily     Daily     PHYSICAL EXAM:  CONSTITUTIONAL: No acute distress, well-developed, well-groomed, AAOx3  HEAD: Atraumatic, normocephalic  EYES: EOM intact, PERRLA, conjunctiva and sclera clear  ENT: Supple, no masses, no thyromegaly, no bruits, no JVD; moist mucous membranes  PULMONARY: Clear to auscultation bilaterally; no wheezes, rales, or rhonchi  CARDIOVASCULAR: Regular rate and rhythm; no murmurs, rubs, or gallops  GASTROINTESTINAL: Soft, non-tender, non-distended; bowel sounds present  MUSCULOSKELETAL: 2+ peripheral pulses; no clubbing, no cyanosis, no edema  NEUROLOGY: non-focal  SKIN: No rashes or lesions; warm and dry    LABS:	 	                          13.8   5.62  )-----------( 263      ( 27 May 2021 04:30 )             42.0     05-27    141  |  106  |  8<L>  ----------------------------<  91  4.2   |  26  |  0.9    Ca    8.9      27 May 2021 04:30  Mg     2.0     05-27    TPro  6.0  /  Alb  3.9  /  TBili  0.6  /  DBili  x   /  AST  17  /  ALT  22  /  AlkPhos  70  05-27    CARDIAC MARKERS:  Troponin trend: <0.01 (24 May 2021 20:10)    TELEMETRY EVENTS:  Not on tele      ECG:  Normal sinus rhythm. HR 67 bpm    RADIOLOGY:  < from: MR Head w/wo IV Cont (05.25.21 @ 19:20) >  Focal acute infarcts in a right MCA distribution without evidence transformation.  No abnormal intracranial enhancement.  < end of copied text >    < from: CT Angio Neck w/ IV Cont (05.24.21 @ 20:23) >  Focal nonocclusive thrombus of the right inferior division M2 segment of the MCA with reconstitution distally. Distal MCA cortical branch vessels are well opacified.  < end of copied text >    Echocardiogram:  < from: TTE Echo Complete w/o Contrast w/ Doppler (05.26.21 @ 12:22) >   1. Left ventricular ejection fraction, by visual estimation, is 55 to 60%.   2. Trace mitral valve regurgitation.   3. Mild tricuspid regurgitation.    PHYSICIAN INTERPRETATION:  Left Ventricle: Normal left ventricular size and wall thicknesses, with normal systolic and diastolic function. Left ventricular ejection fraction, by visual estimation, is 55 to 60%.  Right Ventricle: Normal right ventricular size and function.  Pericardium: There is no evidence of pericardial effusion.  Mitral Valve: Structurally normal mitral valve, with normal leaflet excursion. Trace mitral valve regurgitation is seen.  Tricuspid Valve: Structurally normal tricuspid valve, with normal leaflet excursion. Mild tricuspid regurgitation is visualized.  Aortic Valve: The aorticvalve is trileaflet. No evidence of aortic stenosis. Aortic valve thickening with normal leaflet opening. No evidence of aortic valve regurgitation is seen.  Pulmonic Valve: Structurally normal pulmonic valve, with normal leaflet excursion. No indication of pulmonic valve regurgitation.  Aorta: The aortic root and ascending aorta are structurally normal, with no evidence of dilitation.  Pulmonary Artery: The main pulmonary artery is normal in size.  Venous: The inferior vena cava was normal sized, with respiratory size variation greater than 50%.      Catheterization: None    Stress Test: None  	    Home Medications:    MEDICATIONS  (STANDING):  aspirin  chewable 81 milliGRAM(s) Oral daily  atorvastatin 80 milliGRAM(s) Oral at bedtime  chlorhexidine 4% Liquid 1 Application(s) Topical once  clopidogrel Tablet 75 milliGRAM(s) Oral daily  enoxaparin Injectable 40 milliGRAM(s) SubCutaneous daily  sodium chloride 0.9%. 1000 milliLiter(s) (125 mL/Hr) IV Continuous <Continuous>    MEDICATIONS  (PRN):  acetaminophen   Tablet .. 650 milliGRAM(s) Oral every 6 hours PRN Temp greater or equal to 38C (100.4F), Mild Pain (1 - 3)         Date of Admission: 05-24-21    CHIEF COMPLAINT: [place]    HISTORY OF PRESENT ILLNESS:   54 YO M, Malay speaking, w/ no sig PMHx presented from Urgent Care w/ LUE numbness that started on the morning of presentation. Patient received 2nd dose of Moderna COVID-19 Vaccine 10 days before presentation.   In the ED Code stroke called. MR Brain showed Subacute appearing infarct in the right parietotemporal region. CTA H&N showed Focal nonocclusive thrombus of the right inferior division M2 segment of the MCA with reconstitution distally.  Out of window for tPA & not a candidate for emergent neuroendovascular intervention given low NIHSS score  Received 325mg Aspirin x1 and started on ASA 81mg PO QD  Denies any prior cardiac history. Never followed with a cardiologist   Cardiology consulted for [place]    PAST MEDICAL & SURGICAL HISTORY:  No pertinent past medical history    HEALTH ISSUES - PROBLEM Dx:    FAMILY HISTORY:  None [X]     SOCIAL HISTORY:    [X] Non-smoker  [X] Drinks Alcohol socially     Allergies    No Known Allergies    Intolerances	    REVIEW OF SYMPTOMS:  CONSTITUTIONAL: No weakness, fevers or chills; No headaches  EYES: No visual changes, eye pain, or discharge  ENT: No vertigo; No ear pain or change in hearing; No sore throat or difficulty swallowing  NECK: No pain or stiffness  RESPIRATORY: No cough, wheezing, or hemoptysis; No shortness of breath  CARDIOVASCULAR: No chest pain or palpitations  GASTROINTESTINAL: No abdominal or epigastric pain; No nausea, vomiting, or hematemesis; No diarrhea or constipation; No melena or hematochezia  GENITOURINARY: No dysuria, frequency or hematuria  MUSCULOSKELETAL: No joint pain, no muscle pain, no weakness  NEUROLOGICAL: No numbness or weakness  SKIN: No itching or rashes    VITALS:  T(C): 35.6 (05-27-21 @ 13:03), Max: 36.7 (05-26-21 @ 20:56)  HR: 64 (05-27-21 @ 13:03) (64 - 72)  BP: 106/70 (05-27-21 @ 13:03) (105/62 - 119/61)  RR: 18 (05-27-21 @ 13:03) (16 - 18)  SpO2: --  Wt(kg): --  I&O's Summary    27 May 2021 07:01  -  27 May 2021 14:45  --------------------------------------------------------  IN: 725 mL / OUT: 0 mL / NET: 725 mL      Daily     Daily     PHYSICAL EXAM:  CONSTITUTIONAL: No acute distress, well-developed, well-groomed, AAOx3  HEAD: Atraumatic, normocephalic  EYES: EOM intact, PERRLA, conjunctiva and sclera clear  ENT: Supple, no masses, no thyromegaly, no bruits, no JVD; moist mucous membranes  PULMONARY: Clear to auscultation bilaterally; no wheezes, rales, or rhonchi  CARDIOVASCULAR: Regular rate and rhythm; no murmurs, rubs, or gallops  GASTROINTESTINAL: Soft, non-tender, non-distended; bowel sounds present  MUSCULOSKELETAL: 2+ peripheral pulses; no clubbing, no cyanosis, no edema  NEUROLOGY: non-focal  SKIN: No rashes or lesions; warm and dry    LABS:	 	                          13.8   5.62  )-----------( 263      ( 27 May 2021 04:30 )             42.0     05-27    141  |  106  |  8<L>  ----------------------------<  91  4.2   |  26  |  0.9    Ca    8.9      27 May 2021 04:30  Mg     2.0     05-27    TPro  6.0  /  Alb  3.9  /  TBili  0.6  /  DBili  x   /  AST  17  /  ALT  22  /  AlkPhos  70  05-27    CARDIAC MARKERS:  Troponin trend: <0.01 (24 May 2021 20:10)      ECG:  Normal sinus rhythm. HR 67 bpm    RADIOLOGY:  < from: MR Head w/wo IV Cont (05.25.21 @ 19:20) >  Focal acute infarcts in a right MCA distribution without evidence transformation.  No abnormal intracranial enhancement.  < end of copied text >    < from: CT Angio Neck w/ IV Cont (05.24.21 @ 20:23) >  Focal nonocclusive thrombus of the right inferior division M2 segment of the MCA with reconstitution distally. Distal MCA cortical branch vessels are well opacified.  < end of copied text >    Echocardiogram:  < from: TTE Echo Complete w/o Contrast w/ Doppler (05.26.21 @ 12:22) >   1. Left ventricular ejection fraction, by visual estimation, is 55 to 60%.   2. Trace mitral valve regurgitation.   3. Mild tricuspid regurgitation.    PHYSICIAN INTERPRETATION:  Left Ventricle: Normal left ventricular size and wall thicknesses, with normal systolic and diastolic function. Left ventricular ejection fraction, by visual estimation, is 55 to 60%.  Right Ventricle: Normal right ventricular size and function.  Pericardium: There is no evidence of pericardial effusion.  Mitral Valve: Structurally normal mitral valve, with normal leaflet excursion. Trace mitral valve regurgitation is seen.  Tricuspid Valve: Structurally normal tricuspid valve, with normal leaflet excursion. Mild tricuspid regurgitation is visualized.  Aortic Valve: The aorticvalve is trileaflet. No evidence of aortic stenosis. Aortic valve thickening with normal leaflet opening. No evidence of aortic valve regurgitation is seen.  Pulmonic Valve: Structurally normal pulmonic valve, with normal leaflet excursion. No indication of pulmonic valve regurgitation.  Aorta: The aortic root and ascending aorta are structurally normal, with no evidence of dilitation.  Pulmonary Artery: The main pulmonary artery is normal in size.  Venous: The inferior vena cava was normal sized, with respiratory size variation greater than 50%.      Catheterization: None    Stress Test: None  	    Home Medications:    MEDICATIONS  (STANDING):  aspirin  chewable 81 milliGRAM(s) Oral daily  atorvastatin 80 milliGRAM(s) Oral at bedtime  chlorhexidine 4% Liquid 1 Application(s) Topical once  clopidogrel Tablet 75 milliGRAM(s) Oral daily  enoxaparin Injectable 40 milliGRAM(s) SubCutaneous daily  sodium chloride 0.9%. 1000 milliLiter(s) (125 mL/Hr) IV Continuous <Continuous>    MEDICATIONS  (PRN):  acetaminophen   Tablet .. 650 milliGRAM(s) Oral every 6 hours PRN Temp greater or equal to 38C (100.4F), Mild Pain (1 - 3)

## 2021-05-28 LAB
ALBUMIN SERPL ELPH-MCNC: 4.6 G/DL — SIGNIFICANT CHANGE UP (ref 3.5–5.2)
ALP SERPL-CCNC: 84 U/L — SIGNIFICANT CHANGE UP (ref 30–115)
ALT FLD-CCNC: 35 U/L — SIGNIFICANT CHANGE UP (ref 0–41)
ANION GAP SERPL CALC-SCNC: 10 MMOL/L — SIGNIFICANT CHANGE UP (ref 7–14)
ANION GAP SERPL CALC-SCNC: 11 MMOL/L — SIGNIFICANT CHANGE UP (ref 7–14)
APCR PPP: 2.7 RATIO — SIGNIFICANT CHANGE UP
AST SERPL-CCNC: 34 U/L — SIGNIFICANT CHANGE UP (ref 0–41)
AT III ACT/NOR PPP CHRO: 98 % — SIGNIFICANT CHANGE UP (ref 85–135)
B2 GLYCOPROT1 AB SER QL: NEGATIVE — SIGNIFICANT CHANGE UP
BASOPHILS # BLD AUTO: 0.05 K/UL — SIGNIFICANT CHANGE UP (ref 0–0.2)
BASOPHILS NFR BLD AUTO: 1.1 % — HIGH (ref 0–1)
BILIRUB SERPL-MCNC: 0.3 MG/DL — SIGNIFICANT CHANGE UP (ref 0.2–1.2)
BUN SERPL-MCNC: 9 MG/DL — LOW (ref 10–20)
BUN SERPL-MCNC: 9 MG/DL — LOW (ref 10–20)
CALCIUM SERPL-MCNC: 9.6 MG/DL — SIGNIFICANT CHANGE UP (ref 8.5–10.1)
CALCIUM SERPL-MCNC: 9.7 MG/DL — SIGNIFICANT CHANGE UP (ref 8.5–10.1)
CHLORIDE SERPL-SCNC: 103 MMOL/L — SIGNIFICANT CHANGE UP (ref 98–110)
CHLORIDE SERPL-SCNC: 105 MMOL/L — SIGNIFICANT CHANGE UP (ref 98–110)
CK MB CFR SERPL CALC: 1.4 NG/ML — SIGNIFICANT CHANGE UP (ref 0.6–6.3)
CK SERPL-CCNC: 62 U/L — SIGNIFICANT CHANGE UP (ref 0–225)
CO2 SERPL-SCNC: 26 MMOL/L — SIGNIFICANT CHANGE UP (ref 17–32)
CO2 SERPL-SCNC: 26 MMOL/L — SIGNIFICANT CHANGE UP (ref 17–32)
CREAT SERPL-MCNC: 0.8 MG/DL — SIGNIFICANT CHANGE UP (ref 0.7–1.5)
CREAT SERPL-MCNC: 0.8 MG/DL — SIGNIFICANT CHANGE UP (ref 0.7–1.5)
EOSINOPHIL # BLD AUTO: 0.04 K/UL — SIGNIFICANT CHANGE UP (ref 0–0.7)
EOSINOPHIL NFR BLD AUTO: 0.9 % — SIGNIFICANT CHANGE UP (ref 0–8)
GLUCOSE SERPL-MCNC: 110 MG/DL — HIGH (ref 70–99)
GLUCOSE SERPL-MCNC: 88 MG/DL — SIGNIFICANT CHANGE UP (ref 70–99)
HCT VFR BLD CALC: 44.5 % — SIGNIFICANT CHANGE UP (ref 42–52)
HCT VFR BLD CALC: 45.2 % — SIGNIFICANT CHANGE UP (ref 42–52)
HGB BLD-MCNC: 15 G/DL — SIGNIFICANT CHANGE UP (ref 14–18)
HGB BLD-MCNC: 15.3 G/DL — SIGNIFICANT CHANGE UP (ref 14–18)
IMM GRANULOCYTES NFR BLD AUTO: 0.9 % — HIGH (ref 0.1–0.3)
LYMPHOCYTES # BLD AUTO: 1.71 K/UL — SIGNIFICANT CHANGE UP (ref 1.2–3.4)
LYMPHOCYTES # BLD AUTO: 36.7 % — SIGNIFICANT CHANGE UP (ref 20.5–51.1)
MAGNESIUM SERPL-MCNC: 2.1 MG/DL — SIGNIFICANT CHANGE UP (ref 1.8–2.4)
MAGNESIUM SERPL-MCNC: 2.2 MG/DL — SIGNIFICANT CHANGE UP (ref 1.8–2.4)
MCHC RBC-ENTMCNC: 27.9 PG — SIGNIFICANT CHANGE UP (ref 27–31)
MCHC RBC-ENTMCNC: 28 PG — SIGNIFICANT CHANGE UP (ref 27–31)
MCHC RBC-ENTMCNC: 33.7 G/DL — SIGNIFICANT CHANGE UP (ref 32–37)
MCHC RBC-ENTMCNC: 33.8 G/DL — SIGNIFICANT CHANGE UP (ref 32–37)
MCV RBC AUTO: 82.6 FL — SIGNIFICANT CHANGE UP (ref 80–94)
MCV RBC AUTO: 82.7 FL — SIGNIFICANT CHANGE UP (ref 80–94)
MONOCYTES # BLD AUTO: 0.25 K/UL — SIGNIFICANT CHANGE UP (ref 0.1–0.6)
MONOCYTES NFR BLD AUTO: 5.4 % — SIGNIFICANT CHANGE UP (ref 1.7–9.3)
NEUTROPHILS # BLD AUTO: 2.57 K/UL — SIGNIFICANT CHANGE UP (ref 1.4–6.5)
NEUTROPHILS NFR BLD AUTO: 55 % — SIGNIFICANT CHANGE UP (ref 42.2–75.2)
NRBC # BLD: 0 /100 WBCS — SIGNIFICANT CHANGE UP (ref 0–0)
NRBC # BLD: 0 /100 WBCS — SIGNIFICANT CHANGE UP (ref 0–0)
PLATELET # BLD AUTO: 315 K/UL — SIGNIFICANT CHANGE UP (ref 130–400)
PLATELET # BLD AUTO: 327 K/UL — SIGNIFICANT CHANGE UP (ref 130–400)
POTASSIUM SERPL-MCNC: 3.9 MMOL/L — SIGNIFICANT CHANGE UP (ref 3.5–5)
POTASSIUM SERPL-MCNC: 4.5 MMOL/L — SIGNIFICANT CHANGE UP (ref 3.5–5)
POTASSIUM SERPL-SCNC: 3.9 MMOL/L — SIGNIFICANT CHANGE UP (ref 3.5–5)
POTASSIUM SERPL-SCNC: 4.5 MMOL/L — SIGNIFICANT CHANGE UP (ref 3.5–5)
PROT SERPL-MCNC: 6.6 G/DL — SIGNIFICANT CHANGE UP (ref 6–8)
RBC # BLD: 5.38 M/UL — SIGNIFICANT CHANGE UP (ref 4.7–6.1)
RBC # BLD: 5.47 M/UL — SIGNIFICANT CHANGE UP (ref 4.7–6.1)
RBC # FLD: 13 % — SIGNIFICANT CHANGE UP (ref 11.5–14.5)
RBC # FLD: 13.1 % — SIGNIFICANT CHANGE UP (ref 11.5–14.5)
SODIUM SERPL-SCNC: 140 MMOL/L — SIGNIFICANT CHANGE UP (ref 135–146)
SODIUM SERPL-SCNC: 141 MMOL/L — SIGNIFICANT CHANGE UP (ref 135–146)
TROPONIN T SERPL-MCNC: <0.01 NG/ML — SIGNIFICANT CHANGE UP
VIT B12 SERPL-MCNC: 321 PG/ML — SIGNIFICANT CHANGE UP (ref 232–1245)
WBC # BLD: 4.66 K/UL — LOW (ref 4.8–10.8)
WBC # BLD: 5.93 K/UL — SIGNIFICANT CHANGE UP (ref 4.8–10.8)
WBC # FLD AUTO: 4.66 K/UL — LOW (ref 4.8–10.8)
WBC # FLD AUTO: 5.93 K/UL — SIGNIFICANT CHANGE UP (ref 4.8–10.8)

## 2021-05-28 PROCEDURE — 99222 1ST HOSP IP/OBS MODERATE 55: CPT

## 2021-05-28 PROCEDURE — 93010 ELECTROCARDIOGRAM REPORT: CPT

## 2021-05-28 RX ORDER — SODIUM CHLORIDE 9 MG/ML
1000 INJECTION INTRAMUSCULAR; INTRAVENOUS; SUBCUTANEOUS
Refills: 0 | Status: DISCONTINUED | OUTPATIENT
Start: 2021-05-28 | End: 2021-05-28

## 2021-05-28 RX ADMIN — Medication 650 MILLIGRAM(S): at 21:00

## 2021-05-28 RX ADMIN — SODIUM CHLORIDE 50 MILLILITER(S): 9 INJECTION INTRAMUSCULAR; INTRAVENOUS; SUBCUTANEOUS at 09:16

## 2021-05-28 RX ADMIN — Medication 81 MILLIGRAM(S): at 15:20

## 2021-05-28 RX ADMIN — CLOPIDOGREL BISULFATE 75 MILLIGRAM(S): 75 TABLET, FILM COATED ORAL at 15:20

## 2021-05-28 RX ADMIN — ATORVASTATIN CALCIUM 80 MILLIGRAM(S): 80 TABLET, FILM COATED ORAL at 21:11

## 2021-05-28 RX ADMIN — ENOXAPARIN SODIUM 40 MILLIGRAM(S): 100 INJECTION SUBCUTANEOUS at 15:19

## 2021-05-28 RX ADMIN — Medication 650 MILLIGRAM(S): at 20:00

## 2021-05-28 NOTE — PROGRESS NOTE ADULT - SUBJECTIVE AND OBJECTIVE BOX
Before starting the procedure and before timeout the patient became bradycardic and hypotensive, most likely vasovagal from IV insertion requiring IV fluids, Levophed and Epi. BP and HR recovered after meds and fluid administration. See anesthesia note for medication doses.   ISRAEL procedure canalled   The patient will be monitored in the CCU for 24h

## 2021-05-28 NOTE — PROGRESS NOTE ADULT - SUBJECTIVE AND OBJECTIVE BOX
PREOPERATIVE DAY OF PROCEDURE EVALUATION:  I have personally seen and examined the patient.  I agree with the history and physical which I have reviewed and noted any changes below.  (Signed electronically by ______tommy____)  05-28-21 @ 11:46

## 2021-05-28 NOTE — CONSULT NOTE ADULT - ASSESSMENT
56y Male with no significant PMH, syncopal episode 1yr ago work up negative at that time (per patient admitted with LUE numbness, subsequent work up revealed  subacute infarct in right parietotemporal lobe and Focal nonocclusive thrombus of the right inferior division M2 segment of the MCA.   Hypercoagulable work up negative to date  ISRA[place] today    Patient is recommended for implantable loop recorder to monitor for arrhythmias as possible etiology of cryptogenic stroke  d/w patient and family sister Stephani Palmer  Patient is in agrement for the procedure  Informed Consent obtained  will proceed with procedure today after ISRA[place]

## 2021-05-28 NOTE — CONSULT NOTE ADULT - SUBJECTIVE AND OBJECTIVE BOX
Patient is a 56y old  Male who presents with a chief complaint of LUE numbness (27 May 2021 14:44)        HPI:  54 yo M w/ no pmhx presents from Urgent Care w/ LUE numbness that started this AM ~6AM.   Patient received 2nd dose of Moderna COVID Vaccine ~10days ago.     Denies dysarthria, aphasia, visual or other sensory defects.    ED course: *** Code stroke called ***   - VS wnl  - CXR not impressive  - MR Brain: Subacute appearing infarct in the right parietotemporal region.    - CTA H&N: Focal nonocclusive thrombus of the right inferior division M2 segment of the MCA with reconstitution distally.  - Seen by Neurology>> Out of window for tPA & not a candidate for emergent neuroendovascular intervention given low NIHSS score consistent with non-disabling deficits.   - Received 325mg Aspirin x1    Admit to Stroke Unit.    (25 May 2021 00:23)      Electrophysiology:  56y Male    REVIEW OF SYSTEMS    [ ] A ten-point review of systems was otherwise negative except as noted.  [ ] Due to altered mental status/intubation, subjective information were not able to be obtained from the patient. History was obtained, to the extent possible, from review of the chart and collateral sources of information.      PAST MEDICAL & SURGICAL HISTORY:  No pertinent past medical history        Home Medications:      Allergies:  No Known Allergies      FAMILY HISTORY:      SOCIAL HISTORY:    CIGARETTES:  ALCOHOL:        PREVIOUS DIAGNOSTIC TESTING:      ECHO  FINDINGS:    STRESS  FINDINGS:    CATHETERIZATION  FINDINGS:    ELECTROPHYSIOLOGY STUDY  FINDINGS:    CAROTID ULTRASOUND:  FINDINGS    VENOUS DUPLEX SCAN:  FINDINGS:    CHEST CT PULMONARY ANGIO with IV Contrast:  FINDINGS:      MEDICATIONS  (STANDING):  aspirin  chewable 81 milliGRAM(s) Oral daily  atorvastatin 80 milliGRAM(s) Oral at bedtime  chlorhexidine 4% Liquid 1 Application(s) Topical once  clopidogrel Tablet 75 milliGRAM(s) Oral daily  enoxaparin Injectable 40 milliGRAM(s) SubCutaneous daily  sodium chloride 0.9%. 1000 milliLiter(s) (50 mL/Hr) IV Continuous <Continuous>    MEDICATIONS  (PRN):  acetaminophen   Tablet .. 650 milliGRAM(s) Oral every 6 hours PRN Temp greater or equal to 38C (100.4F), Mild Pain (1 - 3)      Vital Signs Last 24 Hrs  T(C): 36.4 (28 May 2021 04:52), Max: 36.4 (27 May 2021 21:06)  T(F): 97.5 (28 May 2021 04:52), Max: 97.5 (27 May 2021 21:06)  HR: 69 (28 May 2021 04:52) (64 - 69)  BP: 140/77 (28 May 2021 04:52) (106/70 - 140/77)  BP(mean): --  RR: 18 (28 May 2021 04:52) (18 - 18)  SpO2: --    PHYSICAL EXAM:    GENERAL: In no apparent distress, well nourished, and hydrated.  HEAD:  Atraumatic, Normocephalic  EYES: EOMI, PERRLA, conjunctiva and sclera clear  NECK: Supple and normal thyroid.  No JVD or carotid bruit.  Carotid pulse is 2+ bilaterally.  HEART: Regular rate and rhythm; No murmurs, rubs, or gallops.  PULMONARY: Clear to auscultation and perfusion.  No rales, wheezing, or rhonchi bilaterally.  ABDOMEN: Soft, Nontender, Nondistended; Bowel sounds present  EXTREMITIES:  2+ Peripheral Pulses, No clubbing, cyanosis, or edema  NEUROLOGICAL: Grossly nonfocal    I&O's Detail    27 May 2021 07:01  -  28 May 2021 07:00  --------------------------------------------------------  IN:    sodium chloride 0.9%: 725 mL  Total IN: 725 mL    OUT:  Total OUT: 0 mL    Total NET: 725 mL        Daily     Daily     INTERPRETATION OF TELEMETRY:    ECG:        LABS:                        13.8   5.62  )-----------( 263      ( 27 May 2021 04:30 )             42.0     05-27    141  |  106  |  8<L>  ----------------------------<  91  4.2   |  26  |  0.9    Ca    8.9      27 May 2021 04:30  Mg     2.0     05-27    TPro  6.0  /  Alb  3.9  /  TBili  0.6  /  DBili  x   /  AST  17  /  ALT  22  /  AlkPhos  70  05-27            BNP            RADIOLOGY & ADDITIONAL STUDIES:       Patient is a 56y old  Male who presents with a chief complaint of LUE numbness (27 May 2021 14:44)        HPI:  56 yo M w/ no pmhx presents from Urgent Care w/ LUE numbness that started this AM ~6AM.   Patient received 2nd dose of Moderna COVID Vaccine ~10days ago.     Denies dysarthria, aphasia, visual or other sensory defects.    ED course: *** Code stroke called ***   - VS wnl  - CXR not impressive  - MR Brain: Subacute appearing infarct in the right parietotemporal region.    - CTA H&N: Focal nonocclusive thrombus of the right inferior division M2 segment of the MCA with reconstitution distally.  - Seen by Neurology>> Out of window for tPA & not a candidate for emergent neuroendovascular intervention given low NIHSS score consistent with non-disabling deficits.   - Received 325mg Aspirin x1    Admit to Stroke Unit.    (25 May 2021 00:23)      Electrophysiology:  56y Male with no significant PMH, syncopal episode 1yr ago work up negative at that time (per patient admitted with LUE numbness, subsequent work up revealed  subacute infarct in right parietotemporal lobe and Focal nonocclusive thrombus of the right inferior division M2 segment of the MCA.   EP consulted for long term cardiac monitoring for cryptogenic sources of the stroke  Patient denies palpitations, dizziness,     REVIEW OF SYSTEMS    [ ] A ten-point review of systems was otherwise negative except as noted.  [ ] Due to altered mental status/intubation, subjective information were not able to be obtained from the patient. History was obtained, to the extent possible, from review of the chart and collateral sources of information.      PAST MEDICAL & SURGICAL HISTORY:  No pertinent past medical history        Home Medications:      Allergies:  No Known Allergies      FAMILY HISTORY:      SOCIAL HISTORY:    CIGARETTES:  ALCOHOL:        PREVIOUS DIAGNOSTIC TESTING:      ECHO  FINDINGS:    STRESS  FINDINGS:    CATHETERIZATION  FINDINGS:    ELECTROPHYSIOLOGY STUDY  FINDINGS:    CAROTID ULTRASOUND:  FINDINGS    VENOUS DUPLEX SCAN:  FINDINGS:    CHEST CT PULMONARY ANGIO with IV Contrast:  FINDINGS:      MEDICATIONS  (STANDING):  aspirin  chewable 81 milliGRAM(s) Oral daily  atorvastatin 80 milliGRAM(s) Oral at bedtime  chlorhexidine 4% Liquid 1 Application(s) Topical once  clopidogrel Tablet 75 milliGRAM(s) Oral daily  enoxaparin Injectable 40 milliGRAM(s) SubCutaneous daily  sodium chloride 0.9%. 1000 milliLiter(s) (50 mL/Hr) IV Continuous <Continuous>    MEDICATIONS  (PRN):  acetaminophen   Tablet .. 650 milliGRAM(s) Oral every 6 hours PRN Temp greater or equal to 38C (100.4F), Mild Pain (1 - 3)      Vital Signs Last 24 Hrs  T(C): 36.4 (28 May 2021 04:52), Max: 36.4 (27 May 2021 21:06)  T(F): 97.5 (28 May 2021 04:52), Max: 97.5 (27 May 2021 21:06)  HR: 69 (28 May 2021 04:52) (64 - 69)  BP: 140/77 (28 May 2021 04:52) (106/70 - 140/77)  BP(mean): --  RR: 18 (28 May 2021 04:52) (18 - 18)  SpO2: --    PHYSICAL EXAM:    GENERAL: In no apparent distress, well nourished, and hydrated.  HEAD:  Atraumatic, Normocephalic  EYES: EOMI, PERRLA, conjunctiva and sclera clear  NECK: Supple and normal thyroid.  No JVD or carotid bruit.  Carotid pulse is 2+ bilaterally.  HEART: Regular rate and rhythm; No murmurs, rubs, or gallops.  PULMONARY: Clear to auscultation and perfusion.  No rales, wheezing, or rhonchi bilaterally.  ABDOMEN: Soft, Nontender, Nondistended; Bowel sounds present  EXTREMITIES:  2+ Peripheral Pulses, No clubbing, cyanosis, or edema  NEUROLOGICAL: Grossly nonfocal    I&O's Detail    27 May 2021 07:01  -  28 May 2021 07:00  --------------------------------------------------------  IN:    sodium chloride 0.9%: 725 mL  Total IN: 725 mL    OUT:  Total OUT: 0 mL    Total NET: 725 mL        Daily     Daily     INTERPRETATION OF TELEMETRY:    ECG:        LABS:                        13.8   5.62  )-----------( 263      ( 27 May 2021 04:30 )             42.0     05-27    141  |  106  |  8<L>  ----------------------------<  91  4.2   |  26  |  0.9    Ca    8.9      27 May 2021 04:30  Mg     2.0     05-27    TPro  6.0  /  Alb  3.9  /  TBili  0.6  /  DBili  x   /  AST  17  /  ALT  22  /  AlkPhos  70  05-27            BNP            RADIOLOGY & ADDITIONAL STUDIES:       Patient is a 56y old  Male who presents with a chief complaint of LUE numbness (27 May 2021 14:44)        HPI:  56 yo M w/ no pmhx presents from Urgent Care w/ LUE numbness that started this AM ~6AM.   Patient received 2nd dose of Moderna COVID Vaccine ~10days ago.     Denies dysarthria, aphasia, visual or other sensory defects.    ED course: *** Code stroke called ***   - VS wnl  - CXR not impressive  - MR Brain: Subacute appearing infarct in the right parietotemporal region.    - CTA H&N: Focal nonocclusive thrombus of the right inferior division M2 segment of the MCA with reconstitution distally.  - Seen by Neurology>> Out of window for tPA & not a candidate for emergent neuroendovascular intervention given low NIHSS score consistent with non-disabling deficits.   - Received 325mg Aspirin x1    Admit to Stroke Unit.    (25 May 2021 00:23)      Electrophysiology:  56y Male with no significant PMH, syncopal episode 1yr ago work up negative at that time (per patient admitted with LUE numbness, subsequent work up revealed  subacute infarct in right parietotemporal lobe and Focal nonocclusive thrombus of the right inferior division M2 segment of the MCA.   Hypercoagulable work up done, negative to date  EP consulted for long term cardiac monitoring for cryptogenic sources of the stroke  Patient denies palpitations, dizziness,     REVIEW OF SYSTEMS    [ ] A ten-point review of systems was otherwise negative except as noted.  [ ] Due to altered mental status/intubation, subjective information were not able to be obtained from the patient. History was obtained, to the extent possible, from review of the chart and collateral sources of information.      PAST MEDICAL & SURGICAL HISTORY:  No pertinent past medical history        Home Medications:      Allergies:  No Known Allergies      FAMILY HISTORY:      SOCIAL HISTORY:    CIGARETTES:  ALCOHOL:        PREVIOUS DIAGNOSTIC TESTING:      ECHO  FINDINGS:  < from: TTE Echo Complete w/o Contrast w/ Doppler (05.26.21 @ 12:22) >  Summary:   1. Left ventricular ejection fraction, by visual estimation, is 55 to 60%.   2. Trace mitral valve regurgitation.   3. Mild tricuspid regurgitation.      < end of copied text >    STRESS  FINDINGS:    CAROTID ULTRASOUND:  FINDINGS  < from: CT Angio Neck w/ IV Cont (05.24.21 @ 20:23) >  No carotid or vertebral artery stenosis.    < end of copied text >        MEDICATIONS  (STANDING):  aspirin  chewable 81 milliGRAM(s) Oral daily  atorvastatin 80 milliGRAM(s) Oral at bedtime  chlorhexidine 4% Liquid 1 Application(s) Topical once  clopidogrel Tablet 75 milliGRAM(s) Oral daily  enoxaparin Injectable 40 milliGRAM(s) SubCutaneous daily  sodium chloride 0.9%. 1000 milliLiter(s) (50 mL/Hr) IV Continuous <Continuous>    MEDICATIONS  (PRN):  acetaminophen   Tablet .. 650 milliGRAM(s) Oral every 6 hours PRN Temp greater or equal to 38C (100.4F), Mild Pain (1 - 3)      Vital Signs Last 24 Hrs  T(C): 36.4 (28 May 2021 04:52), Max: 36.4 (27 May 2021 21:06)  T(F): 97.5 (28 May 2021 04:52), Max: 97.5 (27 May 2021 21:06)  HR: 69 (28 May 2021 04:52) (64 - 69)  BP: 140/77 (28 May 2021 04:52) (106/70 - 140/77)  BP(mean): --  RR: 18 (28 May 2021 04:52) (18 - 18)  SpO2: --    PHYSICAL EXAM:    GENERAL: In no apparent distress, well nourished, and hydrated.  HEAD:  Atraumatic, Normocephalic  EYES: EOMI, PERRLA, conjunctiva and sclera clear  NECK: Supple and normal thyroid.  No JVD or carotid bruit.  Carotid pulse is 2+ bilaterally.  HEART: Regular rate and rhythm; No murmurs, rubs, or gallops.  PULMONARY: Clear to auscultation and perfusion.  No rales, wheezing, or rhonchi bilaterally.  ABDOMEN: Soft, Nontender, Nondistended; Bowel sounds present  EXTREMITIES:  2+ Peripheral Pulses, No clubbing, cyanosis, or edema  NEUROLOGICAL: Grossly nonfocal    I&O's Detail    27 May 2021 07:01  -  28 May 2021 07:00  --------------------------------------------------------  IN:    sodium chloride 0.9%: 725 mL  Total IN: 725 mL    OUT:  Total OUT: 0 mL    Total NET: 725 mL      Daily     Daily     INTERPRETATION OF TELEMETRY:    ECG:  < from: 12 Lead ECG (05.24.21 @ 20:55) >  Ventricular Rate 67 BPM    Atrial Rate 67 BPM    P-R Interval 144 ms    QRS Duration 80 ms    Q-T Interval 372 ms    QTC Calculation(Bazett) 393 ms    P Axis 46 degrees    R Axis -3 degrees    T Axis 29 degrees    Diagnosis Line Normal sinus rhythm  Normal ECG    < end of copied text >        LABS:                        13.8   5.62  )-----------( 263      ( 27 May 2021 04:30 )             42.0     05-27    141  |  106  |  8<L>  ----------------------------<  91  4.2   |  26  |  0.9    Ca    8.9      27 May 2021 04:30  Mg     2.0     05-27    TPro  6.0  /  Alb  3.9  /  TBili  0.6  /  DBili  x   /  AST  17  /  ALT  22  /  AlkPhos  70  05-27    COVID-19 PCR: NotDetec: 5/27/21    Thyroid Stimulating Hormone, Serum: 0.65 uIU/mL (05.25.21 @ 05:20)       BNP            RADIOLOGY & ADDITIONAL STUDIES:      < from: MR Head w/wo IV Cont (05.25.21 @ 19:20) >  IMPRESSION:    Focal acute infarcts in a right MCA distribution without evidence transformation.    No abnormal intracranial enhancement.    < end of copied text >      < from: CT Angio Neck w/ IV Cont (05.24.21 @ 20:23) >  FINDINGS:    CTA neck:  The aortic arch, origin of the great vessels and subclavian arteries are unremarkable. The bilateral common carotid arteries and internal carotid arteries are unremarkable without significant stenosis seen.    The bilateral vertebral arteries are unremarkable without evidence of stenosis.    CTA head:  There is a focal nonocclusive thrombus of the right inferior division M2 of the MCA (series 4, image 277) with reconstitution distally (series 4, image 283). Distal MCA branch vessels are well opacified.    The left MCA is patent.    The distal internal carotid arteries and anterior cerebral arteries are unremarkable without significant  stenosis. The basilar artery, superior cerebellar arteries and posterior cerebral arteries are unremarkable without significant stenosis.    There is no evidence of aneurysm or vessel malformation.    The dural venous sinuses are patent.    IMPRESSION:  Focal nonocclusive thrombus of the right inferior division M2 segment of the MCA with reconstitution distally. Distal MCA cortical branch vessels are well opacified.    Intracranial vessels are otherwise unremarkable without evidence of vessel malformation or aneurysm.    No carotid or vertebral artery stenosis.      < end of copied text >   175.26

## 2021-05-28 NOTE — CONSULT NOTE ADULT - ATTENDING COMMENTS
R MCA M2 clot with left NLF flattening and left sensory loss of the left UE by double simultaneous stimulation and whole of left hand numbness. symptoms started last Friday.   start asa and load with Plavix 300 mg.
Cryptogenic stroke  recommend ILR implant after ISRAEL  plan discussed with patient and sister
pt seen and examined  agree with above  lacie zak am  r/out cardiac sources of emboli

## 2021-05-29 ENCOUNTER — TRANSCRIPTION ENCOUNTER (OUTPATIENT)
Age: 56
End: 2021-05-29

## 2021-05-29 VITALS
HEART RATE: 70 BPM | SYSTOLIC BLOOD PRESSURE: 110 MMHG | DIASTOLIC BLOOD PRESSURE: 68 MMHG | RESPIRATION RATE: 22 BRPM | OXYGEN SATURATION: 98 %

## 2021-05-29 LAB
ALBUMIN SERPL ELPH-MCNC: 4.2 G/DL — SIGNIFICANT CHANGE UP (ref 3.5–5.2)
ALP SERPL-CCNC: 85 U/L — SIGNIFICANT CHANGE UP (ref 30–115)
ALT FLD-CCNC: 60 U/L — HIGH (ref 0–41)
ANION GAP SERPL CALC-SCNC: 7 MMOL/L — SIGNIFICANT CHANGE UP (ref 7–14)
AST SERPL-CCNC: 65 U/L — HIGH (ref 0–41)
BASOPHILS # BLD AUTO: 0.07 K/UL — SIGNIFICANT CHANGE UP (ref 0–0.2)
BASOPHILS NFR BLD AUTO: 0.9 % — SIGNIFICANT CHANGE UP (ref 0–1)
BILIRUB SERPL-MCNC: 0.5 MG/DL — SIGNIFICANT CHANGE UP (ref 0.2–1.2)
BUN SERPL-MCNC: 14 MG/DL — SIGNIFICANT CHANGE UP (ref 10–20)
CALCIUM SERPL-MCNC: 9 MG/DL — SIGNIFICANT CHANGE UP (ref 8.5–10.1)
CHLORIDE SERPL-SCNC: 107 MMOL/L — SIGNIFICANT CHANGE UP (ref 98–110)
CO2 SERPL-SCNC: 27 MMOL/L — SIGNIFICANT CHANGE UP (ref 17–32)
CREAT SERPL-MCNC: 0.8 MG/DL — SIGNIFICANT CHANGE UP (ref 0.7–1.5)
EOSINOPHIL # BLD AUTO: 0.11 K/UL — SIGNIFICANT CHANGE UP (ref 0–0.7)
EOSINOPHIL NFR BLD AUTO: 1.5 % — SIGNIFICANT CHANGE UP (ref 0–8)
GLUCOSE SERPL-MCNC: 90 MG/DL — SIGNIFICANT CHANGE UP (ref 70–99)
HCT VFR BLD CALC: 45.4 % — SIGNIFICANT CHANGE UP (ref 42–52)
HGB BLD-MCNC: 14.7 G/DL — SIGNIFICANT CHANGE UP (ref 14–18)
IMM GRANULOCYTES NFR BLD AUTO: 0.3 % — SIGNIFICANT CHANGE UP (ref 0.1–0.3)
LYMPHOCYTES # BLD AUTO: 2.29 K/UL — SIGNIFICANT CHANGE UP (ref 1.2–3.4)
LYMPHOCYTES # BLD AUTO: 30.9 % — SIGNIFICANT CHANGE UP (ref 20.5–51.1)
MAGNESIUM SERPL-MCNC: 2.3 MG/DL — SIGNIFICANT CHANGE UP (ref 1.8–2.4)
MCHC RBC-ENTMCNC: 27.4 PG — SIGNIFICANT CHANGE UP (ref 27–31)
MCHC RBC-ENTMCNC: 32.4 G/DL — SIGNIFICANT CHANGE UP (ref 32–37)
MCV RBC AUTO: 84.5 FL — SIGNIFICANT CHANGE UP (ref 80–94)
MONOCYTES # BLD AUTO: 0.49 K/UL — SIGNIFICANT CHANGE UP (ref 0.1–0.6)
MONOCYTES NFR BLD AUTO: 6.6 % — SIGNIFICANT CHANGE UP (ref 1.7–9.3)
NEUTROPHILS # BLD AUTO: 4.42 K/UL — SIGNIFICANT CHANGE UP (ref 1.4–6.5)
NEUTROPHILS NFR BLD AUTO: 59.8 % — SIGNIFICANT CHANGE UP (ref 42.2–75.2)
NRBC # BLD: 0 /100 WBCS — SIGNIFICANT CHANGE UP (ref 0–0)
PLATELET # BLD AUTO: 328 K/UL — SIGNIFICANT CHANGE UP (ref 130–400)
POTASSIUM SERPL-MCNC: 4.4 MMOL/L — SIGNIFICANT CHANGE UP (ref 3.5–5)
POTASSIUM SERPL-SCNC: 4.4 MMOL/L — SIGNIFICANT CHANGE UP (ref 3.5–5)
PROT SERPL-MCNC: 6.3 G/DL — SIGNIFICANT CHANGE UP (ref 6–8)
RBC # BLD: 5.37 M/UL — SIGNIFICANT CHANGE UP (ref 4.7–6.1)
RBC # FLD: 13.2 % — SIGNIFICANT CHANGE UP (ref 11.5–14.5)
SODIUM SERPL-SCNC: 141 MMOL/L — SIGNIFICANT CHANGE UP (ref 135–146)
WBC # BLD: 7.4 K/UL — SIGNIFICANT CHANGE UP (ref 4.8–10.8)
WBC # FLD AUTO: 7.4 K/UL — SIGNIFICANT CHANGE UP (ref 4.8–10.8)

## 2021-05-29 PROCEDURE — 93010 ELECTROCARDIOGRAM REPORT: CPT

## 2021-05-29 PROCEDURE — 99231 SBSQ HOSP IP/OBS SF/LOW 25: CPT

## 2021-05-29 PROCEDURE — 99291 CRITICAL CARE FIRST HOUR: CPT

## 2021-05-29 RX ORDER — ASPIRIN/CALCIUM CARB/MAGNESIUM 324 MG
1 TABLET ORAL
Qty: 30 | Refills: 0
Start: 2021-05-29 | End: 2021-06-27

## 2021-05-29 RX ORDER — CLOPIDOGREL BISULFATE 75 MG/1
1 TABLET, FILM COATED ORAL
Qty: 30 | Refills: 0
Start: 2021-05-29 | End: 2021-06-27

## 2021-05-29 RX ORDER — ATORVASTATIN CALCIUM 80 MG/1
1 TABLET, FILM COATED ORAL
Qty: 30 | Refills: 0
Start: 2021-05-29 | End: 2021-06-27

## 2021-05-29 RX ADMIN — Medication 81 MILLIGRAM(S): at 12:32

## 2021-05-29 RX ADMIN — ENOXAPARIN SODIUM 40 MILLIGRAM(S): 100 INJECTION SUBCUTANEOUS at 12:33

## 2021-05-29 RX ADMIN — CLOPIDOGREL BISULFATE 75 MILLIGRAM(S): 75 TABLET, FILM COATED ORAL at 12:32

## 2021-05-29 NOTE — CHART NOTE - NSCHARTNOTEFT_GEN_A_CORE
Electrophysiology PA Note    Event monitor [employer] placed on the patient for 30 days  Bedside teaching provided to patient and family  Follow up with Dr Belle in 6 weeks  [address] [place] [address], [address]  [phone]       Instructions for use:  - Patch placed on left chest wall  - Take patch off every 7 days and remove the white sensor. Throw the patch away and place the sensor on the  until fully charged.  - After sensor charged, attach to a new patch and place on left side of chest. Repeat this EVERY 7 DAYS  - Event monitor comes with phone monitor to assess for any patient symptoms. It is touch screen and needs to be charged EVERY night. If patient has any symptoms follow the prompts on the phone which will send to the office. If no symptoms, phone does not need to be used.  - Patient is to wear MCOT for 30 days,   - When patch is removed, place everything back in the box and use included the pre-paid UPS envelop to send back to the company.  - All chargers and patches can be found in the second small box inside the big box  - Call company, [employer], for more supplies  - For any questions regarding use can call the office [phone]
Neurovascular addendum:  Patient became hypotensive pre ISRAEL. Patient was on Levophed. Now in CCU  NIHSS 0 on exam.     Suggestion:  Can have ISRAEL as outpatient.  Discussed with Dr. Wallis.    Bryan Elizabeth NP  [phone]
pt in procedure room for lacie . Just before the time out vital : BP   57/33=62/35  HR  40-44 100%sat w face mask 10 liters  .Pt alert&oriented feeling somewhat uncomfortable . with consultation at the bedside wit DR. sanders ,pt administered 1 mg of atropin without any response . then pt administerd 4 micrograms of epi nephrinx2 and started on norepinephrine infusion at 0.03 microgram /kg/min.pts  BP and HR improved 111/56   62 respectively.  pt transferred to CCU w full monitoring and 10 O2 face mask . pt in CCU stable and off NOREPI.  Responsibilities of care transferred and accepted by CCU cardiac team.
I was called to see if patient is tPA or endovascular candidate.   Patient is out of the window for tPA, last known normal is 11 am   Patient is not a candidate for endovascular retrieval, NIHSS1 for sensory deficit, non-disabling deficit

## 2021-05-29 NOTE — DISCHARGE NOTE PROVIDER - HOSPITAL COURSE
56 yo M w/ no pmhx presents from Urgent Care w/ LUE numbness that started this AM ~6AM.   Patient received 2nd dose of Moderna COVID Vaccine ~10days ago.     Denies dysarthria, aphasia, visual or other sensory defects.    ED course: *** Code stroke called ***   - VS wnl  - CXR not impressive  - MR Brain: Subacute appearing infarct in the right parietotemporal region.    - CTA H&N: Focal nonocclusive thrombus of the right inferior division M2 segment of the MCA with reconstitution distally.  - Seen by Neurology>> Out of window for tPA & not a candidate for emergent neuroendovascular intervention given low NIHSS score consistent with non-disabling deficits.   - Received 325mg Aspirin x1    Admitted to stroke unit for further monitoring.    Pt underwent ISRAEL 5/28 but before procedure, pt became hypotensive and bradycardic  from vasovagal reaction requiring levophed for brief period of time.     Pt admitted to CCU for further monitoring. No events in CCU course.    Per neurology pt stable for outpt follow up with respective specialities. Given MCOT prior to discharge and will follow with EP for ILR and cardiology for ISRAEL.

## 2021-05-29 NOTE — DISCHARGE NOTE PROVIDER - NSDCCPCAREPLAN_GEN_ALL_CORE_FT
PRINCIPAL DISCHARGE DIAGNOSIS  Diagnosis: CVA (cerebral vascular accident)  Assessment and Plan of Treatment: A CVA is the medical term for a stroke. There are different causes for strokes. One possible cause of a CVA is an arrhythmia known as "atrial fibrillation", which can cause clots to form in the heart that then travel to the brain where they can block blood vessels and cause a stroke.   For this reason, you will have to follow up with cardiology and have a ISRAEL and have your heart rhythm monitored while you are out of the hospital.  Call Dr. Rivas's office on Wednesday 6/2 to coordinate the ISRAEL. You will also have to follow up with an electrophysiologist to monitor your heart rhythm and finally follow up with a neurologist to figure out the next steps of your care.

## 2021-05-29 NOTE — DISCHARGE NOTE PROVIDER - PROVIDER TOKENS
PROVIDER:[TOKEN:[[id number]],FOLLOWUP:[2 weeks]],PROVIDER:[TOKEN:[[id number]]],PROVIDER:[TOKEN:[[id number]],FOLLOWUP:[2 weeks]] PROVIDER:[TOKEN:[[id number]],FOLLOWUP:[2 weeks]],PROVIDER:[TOKEN:[[id number]]],PROVIDER:[TOKEN:[[id number]],FOLLOWUP:[2 weeks]],PROVIDER:[TOKEN:[[id number]],FOLLOWUP:[Routine]] PROVIDER:[TOKEN:[[id number]],FOLLOWUP:[1 week]],PROVIDER:[TOKEN:[[id number]]],PROVIDER:[TOKEN:[[id number]],FOLLOWUP:[1 month]] PROVIDER:[TOKEN:[[id number]],FOLLOWUP:[1 week]],PROVIDER:[TOKEN:[[id number]]],PROVIDER:[TOKEN:[[id number]]]

## 2021-05-29 NOTE — DISCHARGE NOTE PROVIDER - NSDCMRMEDTOKEN_GEN_ALL_CORE_FT
aspirin 81 mg oral tablet, chewable: 1 tab(s) orally once a day  atorvastatin 80 mg oral tablet: 1 tab(s) orally once a day (at bedtime)  clopidogrel 75 mg oral tablet: 1 tab(s) orally once a day

## 2021-05-29 NOTE — DISCHARGE NOTE PROVIDER - NSDCFUADDINST_GEN_ALL_CORE_FT
Call Dr. Rivas's office on Wednesday to arrange for a ISRAEL appointment. Call Dr. Rivas's office on Wednesday to arrange for a ISRAEL appointment.    Follow up with Dr. Frederick in 6 weeks Call Dr. Rivas's office on Wednesday to arrange for a ISRAEL appointment.    Follow up with Dr. Belle in 6 weeks Call Dr. Rivas's office on Wednesday to arrange for a ISRAEL appointment.    Follow up with Dr. Belle in 6 weeks at [address] [place] [address], [address]--[phone]

## 2021-05-29 NOTE — DISCHARGE NOTE PROVIDER - NPI NUMBER (FOR SYSADMIN USE ONLY) :
[[id number]],[[id number]],[[id number]] [[id number]],[[id number]],[[id number]],[[id number]] [[id number]],[[id number]],[[id number]]

## 2021-05-29 NOTE — DISCHARGE NOTE PROVIDER - CARE PROVIDER_API CALL
Judd Magaña)  Neurology; Vascular Neurology  [address], [address] [place] [address]  Phone: [phone]  Fax: [phone]  Follow Up Time: 2 weeks    Bogdan Rivas)  Cardiology; Internal Medicine  [address] [place] [address]  Phone: [phone]  Fax: [phone]  Follow Up Time:     Philip Belle  Cardiology  [address] [place] [address]  Phone: [phone]  Fax: [phone]  Follow Up Time: 2 weeks   Judd Magaña)  Neurology; Vascular Neurology  [address], [address] [place] [address]  Phone: [phone]  Fax: [phone]  Follow Up Time: 2 weeks    Bogdan Rivas)  Cardiology; Internal Medicine  [address] [place] [address] [address]  [place] [address]  Phone: [phone]  Fax: [phone]  Follow Up Time:     Philip Belle  Cardiology  [address] [place] [address]  Phone: [phone]  Fax: [phone]  Follow Up Time: 2 weeks    Geovany Frederick; JORGE LUIS)  Electrophysiology Center  [address] [place] [address] [address]  [place] [address]  Phone: [phone]  Fax: [phone]  Follow Up Time: Routine   Judd Magaña)  Neurology; Vascular Neurology  [address], [address] [place] [address]  Phone: [phone]  Fax: [phone]  Follow Up Time: 1 week    Bogdan Rivas)  Cardiology; Internal Medicine  [address] [place] [address]  Phone: [phone]  Fax: [phone]  Follow Up Time:     Philip Belle  Cardiology  [address] [place] [address]  Phone: [phone]  Fax: [phone]  Follow Up Time: 1 month   Judd Magaña)  Neurology; Vascular Neurology  [address], [address] [place] [address]  Phone: [phone]  Fax: [phone]  Follow Up Time: 1 week    Bogdan Rivas)  Cardiology; Internal Medicine  [address] [place] [address]  Phone: [phone]  Fax: [phone]  Follow Up Time:     Philip Belle  Cardiology  [address] [place] [address]  Phone: [phone]  Fax: [phone]  Follow Up Time:

## 2021-05-29 NOTE — PROGRESS NOTE ADULT - SUBJECTIVE AND OBJECTIVE BOX
HPI:  54 yo M w/ no pmhx presents from Urgent Care w/ LUE numbness that started this AM ~6AM.   Patient received 2nd dose of Moderna COVID Vaccine ~10days ago.     Denies dysarthria, aphasia, visual or other sensory defects.    ED course: *** Code stroke called ***   - VS wnl  - CXR not impressive  - MR Brain: Subacute appearing infarct in the right parietotemporal region.    - CTA H&N: Focal nonocclusive thrombus of the right inferior division M2 segment of the MCA with reconstitution distally.  - Seen by Neurology>> Out of window for tPA & not a candidate for emergent neuroendovascular intervention given low NIHSS score consistent with non-disabling deficits.   - Received 325mg Aspirin x1    Admit to Stroke Unit.    (25 May 2021 00:23)      INTERVAL HISTORY:  No acute events overnight.    PAST MEDICAL & SURGICAL HISTORY  No pertinent past medical history        ALLERGIES:  No Known Allergies      MEDICATIONS:  MEDICATIONS  (STANDING):  aspirin  chewable 81 milliGRAM(s) Oral daily  atorvastatin 80 milliGRAM(s) Oral at bedtime  chlorhexidine 4% Liquid 1 Application(s) Topical once  clopidogrel Tablet 75 milliGRAM(s) Oral daily  enoxaparin Injectable 40 milliGRAM(s) SubCutaneous daily    MEDICATIONS  (PRN):  acetaminophen   Tablet .. 650 milliGRAM(s) Oral every 6 hours PRN Temp greater or equal to 38C (100.4F), Mild Pain (1 - 3)      HOME MEDICATIONS:  Home Medications:        OBJECTIVE:  ICU Vital Signs Last 24 Hrs  T(C): 36.8 (29 May 2021 04:00), Max: 37.1 (28 May 2021 20:00)  T(F): 98.2 (29 May 2021 04:00), Max: 98.8 (28 May 2021 20:00)  HR: 62 (29 May 2021 04:00) (62 - 112)  BP: 97/68 (29 May 2021 04:00) (84/58 - 146/91)  BP(mean): 78 (29 May 2021 04:00) (63 - 106)  ABP: --  ABP(mean): --  RR: 17 (29 May 2021 04:00) (17 - 38)  SpO2: 98% (29 May 2021 04:00) (92% - 100%)      I&O's Summary    27 May 2021 07:01  -  28 May 2021 07:00  --------------------------------------------------------  IN: 725 mL / OUT: 0 mL / NET: 725 mL    28 May 2021 07:01  -  29 May 2021 05:48  --------------------------------------------------------  IN: 550 mL / OUT: 0 mL / NET: 550 mL      Daily Height in cm: 177.8 (28 May 2021 12:36)    Daily     PHYSICAL EXAM:  NEURO: patient is awake , alert and oriented  GEN: Not in acute distress  NECK: no thyroid enlargement, no JVD  LUNGS: Clear to auscultation bilaterally   CARDIOVASCULAR: S1/S2 present, RRR , no murmurs or rubs, no carotid bruits,  + PP bilaterally  ABD: Soft, non-tender, non-distended, +BS  EXT: No RALF  SKIN: Intact  ACCESS Site:    LABS:                        14.7   7.40  )-----------( 328      ( 29 May 2021 04:25 )             45.4     05-28    141  |  105  |  9<L>  ----------------------------<  110<H>  4.5   |  26  |  0.8    Ca    9.7      28 May 2021 16:24  Mg     2.2     05-28    TPro  6.6  /  Alb  4.6  /  TBili  0.3  /  DBili  x   /  AST  34  /  ALT  35  /  AlkPhos  84  05-28      Troponin T, Serum: <0.01 ng/mL (05-28-21 @ 16:24)  Creatine Kinase, Serum: 62 U/L (05-28-21 @ 16:24)    CARDIAC MARKERS ( 28 May 2021 16:24 )  x     / <0.01 ng/mL / 62 U/L / x     / 1.4 ng/mL        Troponin trend:      05-25 Chol 190 LDL -- HDL 38<L> Trig 156<H>      ECG:    TELEMETRY EVENTS:  None

## 2021-05-29 NOTE — DISCHARGE NOTE NURSING/CASE MANAGEMENT/SOCIAL WORK - PATIENT PORTAL LINK FT
You can access the FollowMyHealth Patient Portal offered by [facility] by registering at the following website: [email or web address]. By joining [facility]’s FollowMyHealth portal, you will also be able to view your health information using other applications (apps) compatible with our system.

## 2021-05-29 NOTE — DISCHARGE NOTE PROVIDER - CARE PROVIDERS DIRECT ADDRESSES
[email or web address] ,[email or web address] ,[email or web address]

## 2021-05-29 NOTE — PROGRESS NOTE ADULT - SUBJECTIVE AND OBJECTIVE BOX
Specialty: Neurocritical Care   Note incomplete   Interval Hx:     GCS:  Mota-Tian:  modified Canales:  ICH score:  NIHSS initial was 1 for decreased sensation of left arm     HPI:  54 yo M w/ no pmhx presents from Urgent Care w/ LUE numbness that started this AM ~6AM.   Patient received 2nd dose of Moderna COVID Vaccine ~10days ago.     Denies dysarthria, aphasia, visual or other sensory defects.    ED course: *** Code stroke called ***   - VS wnl  - CXR not impressive  - MR Brain: Subacute appearing infarct in the right parietotemporal region.    - CTA H&N: Focal nonocclusive thrombus of the right inferior division M2 segment of the MCA with reconstitution distally.  - Seen by Neurology>> Out of window for tPA & not a candidate for emergent neuroendovascular intervention given low NIHSS score consistent with non-disabling deficits.   - Received 325mg Aspirin x1    Admit to Stroke Unit.    (25 May 2021 00:23)      Past Medical and Surgical Hx:  PAST MEDICAL & SURGICAL HISTORY:  No pertinent past medical history        Allergies: No Known Allergies      ROS: Patient endorses no complaints at this time. Otherwise, 10-point ROS is negative.     Physical Exam:  Neurological:  Mental Status: Alert and Oriented to person, place, and time, cooperative, pleasant. Affect appropriate, thought, speech, and language coherent. Short- and long-term memory, abstract thinking and calculation intact.  Cranial Nerves:  I: intact  II, III, IV, VI: PERRLA, EOM intact. Buitrago intact by confrontation. No cranial nerve palsy or nystagmus noted.  V: facial sensation intact; masseter/ temporal muscles intact, corneal reflex intact bilaterally  VII: face symmetrical at rest and with expression  VIII: intact to finger rub in the right ear and left ear  IX and X: no hoarseness, gag intact, palate/ uvula rise symmetrically  XI: SCM/ trapezius strength intact bilateral  XII: no dysarthria, tongue weakness/fasiculation   Motor: Normal muscle bulk/tone. Good posture. Strength 5+/5+ upper & lower extremities  Sensory: Sensation to light touch, pain, vibration, proprioception, and stereognosis intact to trunk and bilaterally to both upper and lower extremities. Normal two point discrimination.   Cerebellar Function:  Normal  point to point test.      Vital Signs:  ICU Vital Signs Last 24 Hrs  T(C): 36.2 (29 May 2021 12:15), Max: 37.1 (28 May 2021 20:00)  T(F): 97.2 (29 May 2021 12:15), Max: 98.8 (28 May 2021 20:00)  HR: 68 (29 May 2021 12:15) (62 - 112)  BP: 107/76 (29 May 2021 12:15) (84/58 - 146/91)  BP(mean): 85 (29 May 2021 12:15) (63 - 106)  ABP: --  ABP(mean): --  RR: 22 (29 May 2021 12:15) (17 - 38)  SpO2: 98% (29 May 2021 12:15) (92% - 100%)      Ventilator:      I/Os:  I&O's Detail    28 May 2021 07:01  -  29 May 2021 07:00  --------------------------------------------------------  IN:    Oral Fluid: 400 mL    sodium chloride 0.9%: 150 mL  Total IN: 550 mL    OUT:  Total OUT: 0 mL    Total NET: 550 mL      29 May 2021 07:01  -  29 May 2021 12:32  --------------------------------------------------------  IN:    Oral Fluid: 300 mL  Total IN: 300 mL    OUT:    Voided (mL): 280 mL  Total OUT: 280 mL    Total NET: 20 mL          Labs:  05-29    141  |  107  |  14  ----------------------------<  90  4.4   |  27  |  0.8    Ca    9.0      29 May 2021 04:25  Mg     2.3     05-29    TPro  6.3  /  Alb  4.2  /  TBili  0.5  /  DBili  x   /  AST  65<H>  /  ALT  60<H>  /  AlkPhos  85  05-29    CBC Full  -  ( 29 May 2021 04:25 )  WBC Count : 7.40 K/uL  RBC Count : 5.37 M/uL  Hemoglobin : 14.7 g/dL  Hematocrit : 45.4 %  Platelet Count - Automated : 328 K/uL  Mean Cell Volume : 84.5 fL  Mean Cell Hemoglobin : 27.4 pg  Mean Cell Hemoglobin Concentration : 32.4 g/dL  Auto Neutrophil # : 4.42 K/uL  Auto Lymphocyte # : 2.29 K/uL  Auto Monocyte # : 0.49 K/uL  Auto Eosinophil # : 0.11 K/uL  Auto Basophil # : 0.07 K/uL  Auto Neutrophil % : 59.8 %  Auto Lymphocyte % : 30.9 %  Auto Monocyte % : 6.6 %  Auto Eosinophil % : 1.5 %  Auto Basophil % : 0.9 %      LIVER FUNCTIONS - ( 29 May 2021 04:25 )  Alb: 4.2 g/dL / Pro: 6.3 g/dL / ALK PHOS: 85 U/L / ALT: 60 U/L / AST: 65 U/L / GGT: x               Microbiology:        Medications Current and PRN:  MEDICATIONS  (STANDING):  aspirin  chewable 81 milliGRAM(s) Oral daily  atorvastatin 80 milliGRAM(s) Oral at bedtime  chlorhexidine 4% Liquid 1 Application(s) Topical once  clopidogrel Tablet 75 milliGRAM(s) Oral daily  enoxaparin Injectable 40 milliGRAM(s) SubCutaneous daily    MEDICATIONS  (PRN):  acetaminophen   Tablet .. 650 milliGRAM(s) Oral every 6 hours PRN Temp greater or equal to 38C (100.4F), Mild Pain (1 - 3)      Radiology:  MR Head w/wo IV contrast 5/25/2021  IMPRESSION:  Focal acute infarcts in a right MCA distribution without evidence transformation.  No abnormal intracranial enhancement.    CT Angio Neck & head w/ IV contrast 5/24/2021     IMPRESSION:  Focal nonocclusive thrombus of the right inferior division M2 segment of the MCA with reconstitution distally. Distal MCA cortical branch vessels are well opacified.  Intracranial vessels are otherwise unremarkable without evidence of vessel malformation or aneurysm.  No carotid or vertebral artery stenosis.    Findings discussed by Ashley Yancey M.D., radiology resident with Sameer Davidson at 9:11 PM on 5/24/2021    CT Brain Stroke Protocol 5/24/2021   IMPRESSION:  1.  Subacute appearing infarct in the right parietotemporal region. No acute territorial infarct, intracranial hemorrhage, mass effect or midline shift. Focal thrombosis of the right MCA. Please correlate with concurrent CT  2.  Asymmetry of the right frontal horn, which may be normal variant.      Assessment:    Plan:  #Neuro:  - neurochecks Q4 hour  - ISRAEL to be performed outpatient  - stable for discharge     Ischemic Stroke Work-up:  CTH: performed  Lipid panel:  Serum Cholesterol: 190  Triglycerides: 156  HDL: 38  Non-HDL Cholesterol: 152  LDL: 125  HgA1C: 5.4  TSH: 0.65  2D echo/TTE: to be performed   A/C: continue Aspirin 81 mg PO x 1 dose daily and Plavix 75 mg PO x 1 dose daily   Statin: Atorvastatin 80 mg PO x 1 daily         DEAN Santos  Please feel free to contact for any questions or concerns. Thank you.  Extension: #[phone]   Specialty: Neurocritical Care     NIHSS initial was 1 for decreased sensation of left arm     HPI:  56 yo M w/ no pmhx presents from Urgent Care w/ LUE numbness that started this AM ~6AM.   Patient received 2nd dose of Moderna COVID Vaccine ~10days ago.     Denies dysarthria, aphasia, visual or other sensory defects.    ED course: *** Code stroke called ***   - VS wnl  - CXR not impressive  - MR Brain: Subacute appearing infarct in the right parietotemporal region.    - CTA H&N: Focal nonocclusive thrombus of the right inferior division M2 segment of the MCA with reconstitution distally.  - Seen by Neurology>> Out of window for tPA & not a candidate for emergent neuroendovascular intervention given low NIHSS score consistent with non-disabling deficits.   - Received 325mg Aspirin x1    Admit to Stroke Unit.    (25 May 2021 00:23)      Past Medical and Surgical Hx:  PAST MEDICAL & SURGICAL HISTORY:  No pertinent past medical history        Allergies: No Known Allergies      ROS: Patient endorses no complaints at this time. Otherwise, 10-point ROS is negative.     Physical Exam:  Neurological:  Mental Status: Alert and Oriented to person, place, and time, cooperative, pleasant. Affect appropriate, thought, speech, and language coherent. Short- and long-term memory, abstract thinking and calculation intact.  Cranial Nerves:  I: intact  II, III, IV, VI: PERRLA, EOM intact. Buitrago intact by confrontation. No cranial nerve palsy or nystagmus noted.  V: facial sensation intact; masseter/ temporal muscles intact, corneal reflex intact bilaterally  VII: face symmetrical at rest and with expression  VIII: intact to finger rub in the right ear and left ear  IX and X: no hoarseness, gag intact, palate/ uvula rise symmetrically  XI: SCM/ trapezius strength intact bilateral  XII: no dysarthria, tongue weakness/fasiculation   Motor: Normal muscle bulk/tone. Good posture. Strength 5+/5+ upper & lower extremities  Sensory: Sensation to light touch, pain, vibration, proprioception, and stereognosis intact to trunk and bilaterally to both upper and lower extremities. Normal two point discrimination.   Cerebellar Function:  Normal  point to point test.      Vital Signs:  ICU Vital Signs Last 24 Hrs  T(C): 36.2 (29 May 2021 12:15), Max: 37.1 (28 May 2021 20:00)  T(F): 97.2 (29 May 2021 12:15), Max: 98.8 (28 May 2021 20:00)  HR: 68 (29 May 2021 12:15) (62 - 112)  BP: 107/76 (29 May 2021 12:15) (84/58 - 146/91)  BP(mean): 85 (29 May 2021 12:15) (63 - 106)  ABP: --  ABP(mean): --  RR: 22 (29 May 2021 12:15) (17 - 38)  SpO2: 98% (29 May 2021 12:15) (92% - 100%)      Ventilator:      I/Os:  I&O's Detail    28 May 2021 07:01  -  29 May 2021 07:00  --------------------------------------------------------  IN:    Oral Fluid: 400 mL    sodium chloride 0.9%: 150 mL  Total IN: 550 mL    OUT:  Total OUT: 0 mL    Total NET: 550 mL      29 May 2021 07:01  -  29 May 2021 12:32  --------------------------------------------------------  IN:    Oral Fluid: 300 mL  Total IN: 300 mL    OUT:    Voided (mL): 280 mL  Total OUT: 280 mL    Total NET: 20 mL          Labs:  05-29    141  |  107  |  14  ----------------------------<  90  4.4   |  27  |  0.8    Ca    9.0      29 May 2021 04:25  Mg     2.3     05-29    TPro  6.3  /  Alb  4.2  /  TBili  0.5  /  DBili  x   /  AST  65<H>  /  ALT  60<H>  /  AlkPhos  85  05-29    CBC Full  -  ( 29 May 2021 04:25 )  WBC Count : 7.40 K/uL  RBC Count : 5.37 M/uL  Hemoglobin : 14.7 g/dL  Hematocrit : 45.4 %  Platelet Count - Automated : 328 K/uL  Mean Cell Volume : 84.5 fL  Mean Cell Hemoglobin : 27.4 pg  Mean Cell Hemoglobin Concentration : 32.4 g/dL  Auto Neutrophil # : 4.42 K/uL  Auto Lymphocyte # : 2.29 K/uL  Auto Monocyte # : 0.49 K/uL  Auto Eosinophil # : 0.11 K/uL  Auto Basophil # : 0.07 K/uL  Auto Neutrophil % : 59.8 %  Auto Lymphocyte % : 30.9 %  Auto Monocyte % : 6.6 %  Auto Eosinophil % : 1.5 %  Auto Basophil % : 0.9 %      LIVER FUNCTIONS - ( 29 May 2021 04:25 )  Alb: 4.2 g/dL / Pro: 6.3 g/dL / ALK PHOS: 85 U/L / ALT: 60 U/L / AST: 65 U/L / GGT: x               Microbiology:        Medications Current and PRN:  MEDICATIONS  (STANDING):  aspirin  chewable 81 milliGRAM(s) Oral daily  atorvastatin 80 milliGRAM(s) Oral at bedtime  chlorhexidine 4% Liquid 1 Application(s) Topical once  clopidogrel Tablet 75 milliGRAM(s) Oral daily  enoxaparin Injectable 40 milliGRAM(s) SubCutaneous daily    MEDICATIONS  (PRN):  acetaminophen   Tablet .. 650 milliGRAM(s) Oral every 6 hours PRN Temp greater or equal to 38C (100.4F), Mild Pain (1 - 3)      Radiology:  MR Head w/wo IV contrast 5/25/2021  IMPRESSION:  Focal acute infarcts in a right MCA distribution without evidence transformation.  No abnormal intracranial enhancement.    CT Angio Neck & head w/ IV contrast 5/24/2021     IMPRESSION:  Focal nonocclusive thrombus of the right inferior division M2 segment of the MCA with reconstitution distally. Distal MCA cortical branch vessels are well opacified.  Intracranial vessels are otherwise unremarkable without evidence of vessel malformation or aneurysm.  No carotid or vertebral artery stenosis.    Findings discussed by Ashley Yancey M.D., radiology resident with Sameer Davidson at 9:11 PM on 5/24/2021    CT Brain Stroke Protocol 5/24/2021   IMPRESSION:  1.  Subacute appearing infarct in the right parietotemporal region. No acute territorial infarct, intracranial hemorrhage, mass effect or midline shift. Focal thrombosis of the right MCA. Please correlate with concurrent CT  2.  Asymmetry of the right frontal horn, which may be normal variant.      Assessment: 56 yo M w/ no pmhx presents from Urgent Care w/ LUE numbness. MR Brain: Subacute appearing infarct in the right parietotemporal region. CTA H&N: Focal nonocclusive thrombus of the right inferior division M2 segment of the MCA with reconstitution distally.     Plan:  #Neuro:  - neurochecks Q4 hour until discharge   - ISRAEL to be performed outpatient  - follow up with neurology outpatient in 1 week   - stable for discharge     Ischemic Stroke Work-up:  CTH: performed  Lipid panel:  Serum Cholesterol: 190  Triglycerides: 156  HDL: 38  Non-HDL Cholesterol: 152  LDL: 125  HgA1C: 5.4  TSH: 0.65  2D echo/TTE: TTE completed. ISRAEL to be done outpatient.  A/C: continue Aspirin 81 mg PO x 1 dose daily and Plavix 75 mg PO x 1 dose daily   Statin: Atorvastatin 80 mg PO x 1 daily       DEAN Santos  Please feel free to contact for any questions or concerns. Thank you.  Extension: #[phone]

## 2021-05-31 PROBLEM — Z00.00 ENCOUNTER FOR PREVENTIVE HEALTH EXAMINATION: Status: ACTIVE | Noted: 2021-05-31

## 2021-05-31 LAB
B2 GLYCOPROT1 AB SER QL: NEGATIVE — SIGNIFICANT CHANGE UP
CARDIOLIPIN AB SER-ACNC: NEGATIVE — SIGNIFICANT CHANGE UP
CARDIOLIPIN AB SER-ACNC: NEGATIVE — SIGNIFICANT CHANGE UP
PROT S FREE PPP-ACNC: 78 % — SIGNIFICANT CHANGE UP (ref 63–140)
PROT S FREE PPP-ACNC: 85 % — SIGNIFICANT CHANGE UP (ref 63–140)

## 2021-06-02 LAB
DNA PLOIDY SPEC FC-IMP: SIGNIFICANT CHANGE UP
PTR INTERPRETATION: SIGNIFICANT CHANGE UP

## 2021-06-07 LAB — METHYLMALONATE SERPL-SCNC: 147 NMOL/L — SIGNIFICANT CHANGE UP (ref 0–378)

## 2021-06-08 LAB
DRVVT SCREEN TO CONFIRM RATIO: SIGNIFICANT CHANGE UP
DRVVT SCREEN TO CONFIRM RATIO: SIGNIFICANT CHANGE UP
LA NT DPL PPP QL: SIGNIFICANT CHANGE UP
LA NT DPL PPP QL: SIGNIFICANT CHANGE UP
NORMALIZED SCT PPP-RTO: 0.83 RATIO — SIGNIFICANT CHANGE UP (ref 0–1.16)
NORMALIZED SCT PPP-RTO: SIGNIFICANT CHANGE UP
PROT C ACT/NOR PPP: 86 % — SIGNIFICANT CHANGE UP (ref 65–129)
PROT C ACT/NOR PPP: 91 % — SIGNIFICANT CHANGE UP (ref 65–129)

## 2021-06-09 DIAGNOSIS — R40.2142 COMA SCALE, EYES OPEN, SPONTANEOUS, AT ARRIVAL TO EMERGENCY DEPARTMENT: ICD-10-CM

## 2021-06-09 DIAGNOSIS — I63.311 CEREBRAL INFARCTION DUE TO THROMBOSIS OF RIGHT MIDDLE CEREBRAL ARTERY: ICD-10-CM

## 2021-06-09 DIAGNOSIS — I63.9 CEREBRAL INFARCTION, UNSPECIFIED: ICD-10-CM

## 2021-06-09 DIAGNOSIS — I34.0 NONRHEUMATIC MITRAL (VALVE) INSUFFICIENCY: ICD-10-CM

## 2021-06-09 DIAGNOSIS — I95.9 HYPOTENSION, UNSPECIFIED: ICD-10-CM

## 2021-06-09 DIAGNOSIS — R40.2362 COMA SCALE, BEST MOTOR RESPONSE, OBEYS COMMANDS, AT ARRIVAL TO EMERGENCY DEPARTMENT: ICD-10-CM

## 2021-06-09 DIAGNOSIS — Z79.82 LONG TERM (CURRENT) USE OF ASPIRIN: ICD-10-CM

## 2021-06-09 DIAGNOSIS — R40.2252 COMA SCALE, BEST VERBAL RESPONSE, ORIENTED, AT ARRIVAL TO EMERGENCY DEPARTMENT: ICD-10-CM

## 2021-06-09 DIAGNOSIS — R00.1 BRADYCARDIA, UNSPECIFIED: ICD-10-CM

## 2021-06-09 DIAGNOSIS — G81.94 HEMIPLEGIA, UNSPECIFIED AFFECTING LEFT NONDOMINANT SIDE: ICD-10-CM

## 2021-06-09 DIAGNOSIS — R29.701 NIHSS SCORE 1: ICD-10-CM

## 2021-06-14 PROBLEM — Z78.9 OTHER SPECIFIED HEALTH STATUS: Chronic | Status: ACTIVE | Noted: 2021-05-24

## 2021-06-30 ENCOUNTER — NON-APPOINTMENT (OUTPATIENT)
Age: 56
End: 2021-06-30

## 2021-06-30 ENCOUNTER — APPOINTMENT (OUTPATIENT)
Dept: NEUROLOGY | Facility: CLINIC | Age: 56
End: 2021-06-30

## 2021-07-02 ENCOUNTER — APPOINTMENT (OUTPATIENT)
Dept: CARDIOLOGY | Facility: CLINIC | Age: 56
End: 2021-07-02

## 2021-08-10 ENCOUNTER — APPOINTMENT (OUTPATIENT)
Dept: CARDIOLOGY | Facility: CLINIC | Age: 56
End: 2021-08-10

## 2023-10-17 NOTE — DISCHARGE NOTE PROVIDER - NSDCQMSTATINA_GEN_A_CORE
Quality 226: Preventive Care And Screening: Tobacco Use: Screening And Cessation Intervention: Patient screened for tobacco use and is an ex/non-smoker Quality 130: Documentation Of Current Medications In The Medical Record: Current Medications Documented Quality 47: Advance Care Plan: Advance Care Planning discussed and documented in the medical record; patient did not wish or was not able to name a surrogate decision maker or provide an advance care plan. Detail Level: Detailed Yes

## 2025-07-15 NOTE — DISCHARGE NOTE PROVIDER - NSDCHC_MEDRECSTATUS_GEN_ALL_CORE
Goal Outcome Evaluation:  Plan of Care Reviewed With: patient        Progress: improving  Outcome Evaluation: pt aox4, vs stable on room air. no BM this shift. rested well overnight                              Admission Reconciliation is Completed  Discharge Reconciliation is Not Complete Admission Reconciliation is Completed  Discharge Reconciliation is Completed